# Patient Record
Sex: FEMALE | Race: OTHER
[De-identification: names, ages, dates, MRNs, and addresses within clinical notes are randomized per-mention and may not be internally consistent; named-entity substitution may affect disease eponyms.]

---

## 2017-04-28 ENCOUNTER — HOSPITAL ENCOUNTER (EMERGENCY)
Dept: HOSPITAL 63 - ER | Age: 22
Discharge: HOME | End: 2017-04-28
Payer: OTHER GOVERNMENT

## 2017-04-28 VITALS — DIASTOLIC BLOOD PRESSURE: 75 MMHG | SYSTOLIC BLOOD PRESSURE: 108 MMHG

## 2017-04-28 DIAGNOSIS — R10.9: ICD-10-CM

## 2017-04-28 DIAGNOSIS — R11.2: Primary | ICD-10-CM

## 2017-04-28 DIAGNOSIS — R19.7: ICD-10-CM

## 2017-04-28 LAB
% LYMPHS: 8 % (ref 24–48)
% MONOS: 3 % (ref 0–10)
% SEGS: 88 % (ref 35–66)
ALBUMIN SERPL-MCNC: 4.6 G/DL (ref 3.4–5)
ALBUMIN/GLOB SERPL: 1 {RATIO} (ref 1–1.7)
ALP SERPL-CCNC: 97 U/L (ref 46–116)
ALT SERPL-CCNC: 22 U/L (ref 14–59)
ANION GAP SERPL CALC-SCNC: 11 MMOL/L (ref 6–14)
AST SERPL-CCNC: 12 U/L (ref 15–37)
BASOPHILS # BLD AUTO: 0 X10^3/UL (ref 0–0.2)
BASOPHILS NFR BLD: 0 % (ref 0–3)
BILIRUB SERPL-MCNC: 1 MG/DL (ref 0.2–1)
BUN/CREAT SERPL: 21 (ref 6–20)
CA-I SERPL ISE-MCNC: 19 MG/DL (ref 7–20)
CALCIUM SERPL-MCNC: 9.6 MG/DL (ref 8.5–10.1)
CHLORIDE SERPL-SCNC: 103 MMOL/L (ref 98–107)
CO2 SERPL-SCNC: 25 MMOL/L (ref 21–32)
CREAT SERPL-MCNC: 0.9 MG/DL (ref 0.6–1)
EOSINOPHIL NFR BLD AUTO: 1 % (ref 0–5)
EOSINOPHIL NFR BLD: 0.1 X10^3/UL (ref 0–0.7)
EOSINOPHIL NFR BLD: 1 % (ref 0–3)
ERYTHROCYTE [DISTWIDTH] IN BLOOD BY AUTOMATED COUNT: 12.7 % (ref 11.5–14.5)
GFR SERPLBLD BASED ON 1.73 SQ M-ARVRAT: 79.8 ML/MIN
GLOBULIN SER-MCNC: 4.5 G/DL (ref 2.2–3.8)
GLUCOSE SERPL-MCNC: 92 MG/DL (ref 70–99)
HCT VFR BLD CALC: 46.8 % (ref 36–47)
HGB BLD-MCNC: 15.6 G/DL (ref 12–15.5)
LIPASE: 63 U/L (ref 73–393)
LYMPHOCYTES # BLD: 1.2 X10^3/UL (ref 1–4.8)
LYMPHOCYTES NFR BLD AUTO: 8 % (ref 24–48)
MCH RBC QN AUTO: 29 PG (ref 25–35)
MCHC RBC AUTO-ENTMCNC: 33 G/DL (ref 31–37)
MCV RBC AUTO: 88 FL (ref 79–100)
MONO #: 0.8 X10^3/UL (ref 0–1.1)
MONOCYTES NFR BLD: 5 % (ref 0–9)
NEUT #: 13.3 X10^3UL (ref 1.8–7.7)
NEUTROPHILS NFR BLD AUTO: 87 % (ref 31–73)
PLATELET # BLD AUTO: 259 X10^3/UL (ref 140–400)
PLATELET # BLD EST: ADEQUATE 10*3/UL
POTASSIUM SERPL-SCNC: 3.9 MMOL/L (ref 3.5–5.1)
PROT SERPL-MCNC: 9.1 G/DL (ref 6.4–8.2)
RBC # BLD AUTO: 5.34 X10^6/UL (ref 3.5–5.4)
SODIUM SERPL-SCNC: 139 MMOL/L (ref 136–145)
WBC # BLD AUTO: 15.4 X10^3/UL (ref 4–11)

## 2017-04-28 PROCEDURE — 85007 BL SMEAR W/DIFF WBC COUNT: CPT

## 2017-04-28 PROCEDURE — 80053 COMPREHEN METABOLIC PANEL: CPT

## 2017-04-28 PROCEDURE — 85027 COMPLETE CBC AUTOMATED: CPT

## 2017-04-28 PROCEDURE — 36415 COLL VENOUS BLD VENIPUNCTURE: CPT

## 2017-04-28 PROCEDURE — 83690 ASSAY OF LIPASE: CPT

## 2017-04-28 PROCEDURE — 96375 TX/PRO/DX INJ NEW DRUG ADDON: CPT

## 2017-04-28 PROCEDURE — 99284 EMERGENCY DEPT VISIT MOD MDM: CPT

## 2017-04-28 PROCEDURE — 96374 THER/PROPH/DIAG INJ IV PUSH: CPT

## 2017-04-28 PROCEDURE — 96361 HYDRATE IV INFUSION ADD-ON: CPT

## 2017-04-28 NOTE — PHYS DOC
Past History


Past Medical History:  No Pertinent History


Past Surgical History:  No Surgical History


Alcohol Use:  None


Drug Use:  None





Adult General


Chief Complaint


Chief Complaint:  NAUSEA/VOMITING/DIARRHEA





HPI


HPI


21-year-old female presenting to the emergency department for evaluation of 

nausea vomiting diarrhea and abdominal cramping.  Patient says that she woke up 

with these symptoms and says that she vomited yellowish liquid the first time 

and has been dry heaving since that time. Diarrhea is watery and nonbloody.  

She had a steroid injection in her left shoulder yesterday and is concerned 

that this is somehow related to her symptoms today.  She says that she feels 

slightly dizzy and still nauseated.  Abdominal cramping is diffuse and nonfocal 

and comes and goes.





Review of Systems


Review of Systems





Constitutional: Denies fever or chills []


GI: + abdominal pain, nausea, vomiting, diarrhea []


: Denies dysuria or hematuria []


Musculoskeletal: Denies back pain or joint pain []


Integument: Denies rash or skin lesions []


Neurologic: Denies headache, focal weakness.  + dizziness





Allergies


Allergies





 Allergies








Coded Allergies Type Severity Reaction Last Updated Verified


 


  No Known Drug Allergies    4/3/16 No











Physical Exam


Physical Exam





Constitutional: Well developed, well nourished, no acute distress, non-toxic 

appearance. []


Cardiovascular:Heart rate regular rhythm, no murmur []


Lungs & Thorax:  Bilateral breath sounds clear to auscultation []


Abdomen: Bowel sounds normal, soft, no tenderness, no masses, no pulsatile 

masses. [] 


Skin: Warm, dry, no erythema, no rash. Left shoulder appears clean dry and 

intact with no redness or warmth or swelling.


Extremities: No tenderness, no cyanosis, no clubbing, ROM intact, no edema. [] 


Neurologic: Alert and oriented X 3, normal motor function, normal sensory 

function, no focal deficits noted. []





EKG


EKG


[]





Radiology/Procedures


Radiology/Procedures


[]





Course & Med Decision Making


Course & Med Decision Making


Patient with symptoms consistent with a viral gastroenteritis.  I do not think 

that this has anything to do with her steroid shot isn't looked up cortisone 

and and had no listed side effects of nausea vomiting or diarrhea.  It may be 

blunting her immune response to any viral infection but not necessarily caused 

it. Patient will get screening labs IV fluids Zofran and Toradol and then be 

reassessed.


Labs are very unremarkable she has leukocytosis likely related to steroid.  Her 

repeat abdominal exam is benign and she is not drinking water and eating 

crackers without any difficulty swallowing discharge her with Zofran and 

instructed to drink plenty of fluids including water and Gatorade and come back 

to the ER sooner with any worsening pain fevers vomiting or additional 

concerns.  Patientt aware and agreeable with plan and verbalized understanding 

of the above instructions.





Dragon Disclaimer


Dragon Disclaimer


This chart was dictated in whole or in part using Voice Recognition software in 

a busy, high-work load, and often noisy Emergency Department environment.  It 

may contain unintended and wholly unrecognized errors or omissions.





Departure


Departure:


Impression:  


 Primary Impression:  


 Nausea & vomiting


 Additional Impressions:  


 Diarrhea


 Abdominal pain


Disposition:  01 HOME, SELF-CARE


Condition:  GOOD


Referrals:  


MARIE DOUGLAS (PCP)


Patient Instructions:  Viral Gastroenteritis


Scripts


Ondansetron (Zofran Odt)4 Mg Tab.rapdis1 Tab SL Q8HRS #10 TAB


   Prov:DARRYL KIM DO         4/28/17





Problem Qualifiers








 Primary Impression:  


 Nausea & vomiting


 Vomiting type:  unspecified  Vomiting Intractability:  non-intractable  

Qualified Code:  R11.2 - Nausea with vomiting, unspecified





DARRYL KIM DO Apr 28, 2017 11:37

## 2017-09-30 ENCOUNTER — HOSPITAL ENCOUNTER (EMERGENCY)
Dept: HOSPITAL 63 - ER | Age: 22
Discharge: HOME | End: 2017-09-30
Payer: OTHER GOVERNMENT

## 2017-09-30 VITALS — HEIGHT: 63 IN | WEIGHT: 145.51 LBS | BODY MASS INDEX: 25.78 KG/M2

## 2017-09-30 VITALS
SYSTOLIC BLOOD PRESSURE: 115 MMHG | SYSTOLIC BLOOD PRESSURE: 115 MMHG | DIASTOLIC BLOOD PRESSURE: 63 MMHG | DIASTOLIC BLOOD PRESSURE: 63 MMHG

## 2017-09-30 DIAGNOSIS — R35.0: ICD-10-CM

## 2017-09-30 DIAGNOSIS — R31.9: Primary | ICD-10-CM

## 2017-09-30 DIAGNOSIS — R39.15: ICD-10-CM

## 2017-09-30 LAB
APTT PPP: YELLOW S
BILIRUB UR QL STRIP: (no result)
FIBRINOGEN PPP-MCNC: (no result) MG/DL
GLUCOSE UR STRIP-MCNC: (no result) MG/DL
NITRITE UR QL STRIP: (no result)
SP GR UR STRIP: >=1.03
UROBILINOGEN UR-MCNC: 1 MG/DL

## 2017-09-30 PROCEDURE — 81025 URINE PREGNANCY TEST: CPT

## 2017-09-30 PROCEDURE — 87086 URINE CULTURE/COLONY COUNT: CPT

## 2017-09-30 PROCEDURE — 96372 THER/PROPH/DIAG INJ SC/IM: CPT

## 2017-09-30 PROCEDURE — 74176 CT ABD & PELVIS W/O CONTRAST: CPT

## 2017-09-30 PROCEDURE — 81003 URINALYSIS AUTO W/O SCOPE: CPT

## 2017-09-30 PROCEDURE — 99285 EMERGENCY DEPT VISIT HI MDM: CPT

## 2017-09-30 NOTE — RAD
INDICATION: 375225.001 Severe left sided flank pain 

 

COMPARISON: June 17, 2016

 

TECHNIQUE: 

 

Axial CT images were obtained through the abdomen and pelvis without 

intravenous contrast. 

Limited assessment of solid organ structures and vasculature secondary to 

lack of intravenous contrast. 

 

One or more of the following individualized dose reduction techniques were

utilized for this examination:  1. Automated exposure control;  2. 

Adjustment of the mA and/or kV according to patient size;  3. Use of 

iterative reconstruction technique.

 

FINDINGS:

 

Chest Base: Tiny nodules at left lung base. Given the patient's age these 

are most likely benign unless they have a history of neoplasm.

Vessels: No abdominal aortic aneurysm.

Liver/Biliary: No intrahepatic biliary duct dilation.

Pancreas: No peripancreatic edema.

Spleen: Normal.

Kidneys/Adrenal: No hydronephrosis.

Bladder: No definite adjacent inflammation.

GI: No free air.  No bowel dilation to suggest obstruction. Moderate stool

and proximal colon.

Repeat demonstration of a calcification within the right hemipelvis.

Small free fluid.

 

IMPRESSION:

 

1. No definite hydronephrosis or radiopaque obstructive ureter stone

 

 

2. The suspected appendix does not have definite adjacent inflammation.

 

 

Electronically signed by: Charles Hanson MD (9/30/2017 3:41 AM) [facility]

## 2017-09-30 NOTE — ED.ADGEN
Past History


Past Medical History:  No Pertinent History


Past Surgical History:  No Surgical History


Alcohol Use:  None


Drug Use:  None





Adult General


Chief Complaint


Chief Complaint


Left-sided flank pain





HPI


HPI





Patient is a  22-year-old female presents with intermittent left-sided in 

pain symptoms yesterday. It is described as dull throbbing, moderate to severe 

as with palpation movement. Patient denies nausea, vomiting. She reports 

urinary frequency and urgency. Denies hematuria. No history of kidney stones. 

Fever chills or sweats. No other acute symptoms or complaints. No prior 

abdominal surgeries. Last menstrual period was 3 weeks ago.





Review of Systems


Review of Systems


ROS as per HPI.





Current Medications


Current Medications





Current Medications








 Medications


  (Trade)  Dose


 Ordered  Sig/Zaheer  Start Time


 Stop Time Status Last Admin


Dose Admin


 


 Ketorolac


 Tromethamine


  (Toradol)  60 mg  1X  ONCE  9/30/17 02:30


 9/30/17 02:31 DC 9/30/17 02:22


60 MG


 


 Ondansetron HCl


  (Zofran Odt)  4 mg  1X  ONCE  9/30/17 02:30


 9/30/17 02:31 DC 9/30/17 02:23


4 MG











Allergies


Allergies





Allergies








Coded Allergies Type Severity Reaction Last Updated Verified


 


  No Known Drug Allergies    4/3/16 No











Physical Exam


Physical Exam





Constitutional: Well developed, well nourished, no acute distress, non-toxic 

appearance. []


HENT: Normocephalic, atraumatic, bilateral external ears normal, oropharynx 

moist, no oral exudates, nose normal. []


Eyes: PERRLA, EOMI, conjunctiva normal, no discharge. [] 


Neck: Normal range of motion, no tenderness, supple, no stridor. [] 


Cardiovascular:Heart rate regular rhythm, no murmur []


Lungs & Thorax:  Bilateral breath sounds clear to auscultation []


Abdomen: Bowel sounds normal, soft, no tenderness, no masses, no pulsatile 

masses. [] 


Skin: Warm, dry, no erythema, no rash. [] 


Back: No tenderness, no CVA tenderness. [] 


Extremities: No tenderness, no cyanosis, no clubbing, ROM intact, no edema. [] 


Neurologic: Alert and oriented X 3, normal motor function, normal sensory 

function, no focal deficits noted. []


Psychologic: Affect normal, judgement normal, mood normal. []





Current Patient Data


Vital Signs





 Vital Signs








  Date Time  Temp Pulse Resp B/P (MAP) Pulse Ox O2 Delivery O2 Flow Rate FiO2


 


9/30/17 01:30 97.5 72 20  98 Room Air  








Lab Results





 Laboratory Tests








Test


  9/30/17


01:53 9/30/17


02:30


 


POC Urine HCG, Qualitative


  hcg negative


(Negative) 


 


 


Urine Collection Type  Unknown  


 


Urine Color  Yellow  


 


Urine Clarity  Hazy  


 


Urine pH  6.0  


 


Urine Specific Gravity  >=1.030  


 


Urine Protein


  


  100 mg/dl


(NEG-TRACE)


 


Urine Glucose (UA)


  


  Neg mg/dL


(NEG)


 


Urine Ketones (Stick)


  


  Neg mg/dL


(NEG)


 


Urine Blood  Large (NEG)  


 


Urine Nitrite  Neg (NEG)  


 


Urine Bilirubin  Neg (NEG)  


 


Urine Urobilinogen Dipstick


  


  1 mg/dL (0.2


mg/dL)


 


Urine Leukocyte Esterase  Neg (NEG)  











EKG


EKG


[]





Radiology/Procedures


Radiology/Procedures


[CT abdomen and pelvis without contrast:]





Course & Med Decision Making


Course & Med Decision Making


Pertinent Labs and Imaging studies reviewed. (See chart for details)





[Left flank pain negative pregnancy test, hematuria present. CT abdomen pelvis]





Final Impression


Final Impression


[]


Problems:  





Dragon Disclaimer


Dragon Disclaimer


This electronic medical record was generated, in whole or in part, using a 

voice recognition dictation system.











ERICA DONALDSON DO Sep 30, 2017 03:04

## 2019-06-15 ENCOUNTER — HOSPITAL ENCOUNTER (EMERGENCY)
Facility: HOSPITAL | Age: 24
Discharge: HOME | End: 2019-06-16
Attending: EMERGENCY MEDICINE
Payer: OTHER GOVERNMENT

## 2019-06-15 DIAGNOSIS — L02.91 ABSCESS: Primary | ICD-10-CM

## 2019-06-15 PROCEDURE — 87186 SC STD MICRODIL/AGAR DIL: CPT | Performed by: EMERGENCY MEDICINE

## 2019-06-15 PROCEDURE — 87070 CULTURE OTHR SPECIMN AEROBIC: CPT | Performed by: EMERGENCY MEDICINE

## 2019-06-15 PROCEDURE — 87205 SMEAR GRAM STAIN: CPT | Performed by: EMERGENCY MEDICINE

## 2019-06-15 PROCEDURE — 87077 CULTURE AEROBIC IDENTIFY: CPT | Performed by: EMERGENCY MEDICINE

## 2019-06-15 PROCEDURE — 99283 EMERGENCY DEPT VISIT LOW MDM: CPT

## 2019-06-15 PROCEDURE — 10061 I&D ABSCESS COMP/MULTIPLE: CPT

## 2019-06-15 RX ORDER — CLINDAMYCIN HYDROCHLORIDE 300 MG/1
300 CAPSULE ORAL 3 TIMES DAILY
Qty: 30 CAPSULE | Refills: 0 | Status: SHIPPED | OUTPATIENT
Start: 2019-06-15 | End: 2019-06-25

## 2019-06-15 RX ORDER — DEXTROAMPHETAMINE SACCHARATE, AMPHETAMINE ASPARTATE MONOHYDRATE, DEXTROAMPHETAMINE SULFATE AND AMPHETAMINE SULFATE 5; 5; 5; 5 MG/1; MG/1; MG/1; MG/1
20 CAPSULE, EXTENDED RELEASE ORAL EVERY MORNING
COMMUNITY

## 2019-06-15 RX ORDER — HYDROCODONE BITARTRATE AND ACETAMINOPHEN 5; 325 MG/1; MG/1
1-2 TABLET ORAL EVERY 4 HOURS PRN
Qty: 20 TABLET | Refills: 0 | Status: SHIPPED | OUTPATIENT
Start: 2019-06-15 | End: 2021-11-16 | Stop reason: ALTCHOICE

## 2019-06-16 VITALS
DIASTOLIC BLOOD PRESSURE: 76 MMHG | HEART RATE: 88 BPM | WEIGHT: 150 LBS | TEMPERATURE: 98 F | SYSTOLIC BLOOD PRESSURE: 119 MMHG | RESPIRATION RATE: 16 BRPM | BODY MASS INDEX: 26.58 KG/M2 | HEIGHT: 63 IN | OXYGEN SATURATION: 99 %

## 2019-06-16 NOTE — ED INITIAL ASSESSMENT (HPI)
Pt presents to ED with complaint of abscess to L groin. PT reports abscess has been reoccurring for about 10 years. Reports present for a couple days and excruciating in pain. PT reports wound is leaking.  Denies fevers

## 2019-06-16 NOTE — ED PROVIDER NOTES
Patient Seen in: [facility] Emergency Department    History   Patient presents with:  Eval-G (genital)    Stated Complaint: eval g    HPI    31-year-old female presents to the emergency department with an area in her left groin that has been swollen Cardiovascular: Normal rate, regular rhythm and normal heart sounds. Pulmonary/Chest: Effort normal and breath sounds normal. No respiratory distress. Genitourinary:         Neurological: She is alert and oriented to person, place, and time.    Skin: Sk

## 2020-11-06 ENCOUNTER — HOSPITAL ENCOUNTER (OUTPATIENT)
Dept: HOSPITAL 63 - US | Age: 25
End: 2020-11-06
Payer: OTHER GOVERNMENT

## 2020-11-06 DIAGNOSIS — N83.291: Primary | ICD-10-CM

## 2020-11-06 PROCEDURE — 76830 TRANSVAGINAL US NON-OB: CPT

## 2020-11-06 PROCEDURE — 76856 US EXAM PELVIC COMPLETE: CPT

## 2020-11-06 NOTE — RAD
EXAM: Pelvic sonogram.

 

HISTORY: Pain.

 

TECHNIQUE: Transabdominal and transvaginal sonographic imaging of the 

pelvis was performed.

 

COMPARISON: CT dated 9/30/2017.

 

FINDINGS: The uterus measures 8.0 x 6.2 x 3.0 cm. The endometrial stripe 

measures 1.1 cm in thickness. The ovaries are normal in size and 

demonstrate normal blood flow. There is a 2.3 cm complex right ovarian 

cyst with internal echoes and 7 mm echogenic posterior shadowing component

likely due to calcification. This demonstrates no blood flow. There is a 

small amount of pelvic free fluid.

 

IMPRESSION:

1. 2.3 cm complex right ovarian cyst with internal echoes and suspected 

calcification.

2. Small amount of pelvic free fluid, within physiologic limits for a 

premenopausal female.

 

Electronically signed by: Jazmin Yin MD (11/6/2020 11:33 AM) [id number]

## 2020-11-11 ENCOUNTER — HOSPITAL ENCOUNTER (EMERGENCY)
Dept: HOSPITAL 63 - ER | Age: 25
Discharge: HOME | End: 2020-11-11
Payer: OTHER GOVERNMENT

## 2020-11-11 VITALS — BODY MASS INDEX: 25.78 KG/M2 | HEIGHT: 63 IN | WEIGHT: 145.51 LBS

## 2020-11-11 VITALS — SYSTOLIC BLOOD PRESSURE: 140 MMHG | DIASTOLIC BLOOD PRESSURE: 72 MMHG
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
## 2020-11-11 DIAGNOSIS — T43.621A: Primary | ICD-10-CM

## 2020-11-11 DIAGNOSIS — F90.9: ICD-10-CM

## 2020-11-11 DIAGNOSIS — F32.9: ICD-10-CM

## 2020-11-11 LAB
ACETAMIN: < 2 MCG/ML (ref 10–30)
ALBUMIN SERPL-MCNC: 4.3 G/DL (ref 3.4–5)
ALBUMIN/GLOB SERPL: 1.1 {RATIO} (ref 1–1.7)
ALP SERPL-CCNC: 88 U/L (ref 46–116)
ALT SERPL-CCNC: 18 U/L (ref 14–59)
AMPHETAMINE/METHAMPHETAMINE: (no result)
ANION GAP SERPL CALC-SCNC: 13 MMOL/L (ref 6–14)
AST SERPL-CCNC: 12 U/L (ref 15–37)
BARBITURATES UR-MCNC: (no result) UG/ML
BENZODIAZ UR-MCNC: (no result) UG/L
BILIRUB SERPL-MCNC: 0.3 MG/DL (ref 0.2–1)
BUN/CREAT SERPL: 10 (ref 6–20)
CA-I SERPL ISE-MCNC: 8 MG/DL (ref 7–20)
CALCIUM SERPL-MCNC: 9 MG/DL (ref 8.5–10.1)
CANNABINOIDS UR-MCNC: (no result) UG/L
CHLORIDE SERPL-SCNC: 105 MMOL/L (ref 98–107)
CO2 SERPL-SCNC: 24 MMOL/L (ref 21–32)
COCAINE UR-MCNC: (no result) NG/ML
CREAT SERPL-MCNC: 0.8 MG/DL (ref 0.6–1)
ETHANOL SERPL-MCNC: 170 MG/DL (ref 0–10)
GFR SERPLBLD BASED ON 1.73 SQ M-ARVRAT: 88.1 ML/MIN
GLOBULIN SER-MCNC: 4 G/DL (ref 2.2–3.8)
GLUCOSE SERPL-MCNC: 104 MG/DL (ref 70–99)
METHADONE SERPL-MCNC: (no result) NG/ML
OPIATES UR-MCNC: (no result) NG/ML
PCP SERPL-MCNC: (no result) MG/DL
POTASSIUM SERPL-SCNC: 3.6 MMOL/L (ref 3.5–5.1)
PROT SERPL-MCNC: 8.3 G/DL (ref 6.4–8.2)
SALIC: < 2.8 MG/DL (ref 2.8–20)
SODIUM SERPL-SCNC: 142 MMOL/L (ref 136–145)

## 2020-11-11 PROCEDURE — 71045 X-RAY EXAM CHEST 1 VIEW: CPT

## 2020-11-11 PROCEDURE — 80307 DRUG TEST PRSMV CHEM ANLYZR: CPT

## 2020-11-11 PROCEDURE — 82550 ASSAY OF CK (CPK): CPT

## 2020-11-11 PROCEDURE — 80329 ANALGESICS NON-OPIOID 1 OR 2: CPT

## 2020-11-11 PROCEDURE — 81025 URINE PREGNANCY TEST: CPT

## 2020-11-11 PROCEDURE — 80053 COMPREHEN METABOLIC PANEL: CPT

## 2020-11-11 PROCEDURE — 99285 EMERGENCY DEPT VISIT HI MDM: CPT

## 2020-11-11 PROCEDURE — 36415 COLL VENOUS BLD VENIPUNCTURE: CPT

## 2020-11-11 PROCEDURE — 84484 ASSAY OF TROPONIN QUANT: CPT

## 2020-11-11 PROCEDURE — 83880 ASSAY OF NATRIURETIC PEPTIDE: CPT

## 2020-11-11 PROCEDURE — 93005 ELECTROCARDIOGRAM TRACING: CPT

## 2020-11-11 PROCEDURE — G0480 DRUG TEST DEF 1-7 CLASSES: HCPCS

## 2020-11-11 NOTE — RAD
Study: CR CHEST AP ONLY

 

Indication: Overdose.

 

Comparison: None.

 

Findings:

 

Unremarkable cardiomediastinal silhouette and sanchez. No focal airspace 

infiltrate, pleural effusion or pneumothorax.

 

Impression:

 

Unremarkable radiographic appearance of the chest.

 

Electronically signed by: JAQUELINE THOMAS MD (11/11/2020 5:14 AM) [id number]

## 2020-11-11 NOTE — EKG
[facility] [address], [place] [address]

Test Date:    2020               Test Time:    04:24:57

Pat Name:     SAVANNA GIBBONS             Department:   

Patient ID:   [id number]           Room:          

Gender:       F                        Technician:   

:          [id number]               Requested By: BOBBI CALLE

Order Number: [id number]            Reading MD:     

                                 Measurements

Intervals                              Axis          

Rate:         85                       P:            53

WI:           146                      QRS:          76

QRSD:         82                       T:            21

QT:           362                                    

QTc:          431                                    

                           Interpretive Statements

SINUS RHYTHM

NO SPECIFIC ECG ABNORMALITIES

RI6.02

No previous ECG available for comparison

## 2020-11-11 NOTE — PHYS DOC
Past History


Past Medical History:  Depression, Other


Additional Past Medical Histor:  adhd


Past Surgical History:  No Surgical History


Alcohol Use:  Occasionally


Drug Use:  None





Adult General


Chief Complaint


Chief Complaint:  OVERDOSE





HPI


HPI


Patient is 25-year-old female who presents to the emergency room after taking 4 

of her Adderall and drinking a large amount of alcohol.  She is on extended 

release Adderall.  She has no complaints but is concerned that it could have 

hurt her.  She denies any suicidal ideations.  She states she was trying to get 

high.





Review of Systems


Review of Systems


Complete ROS is negative unless otherwise documented in HPI





Allergies


Allergies





Allergies








Coded Allergies Type Severity Reaction Last Updated Verified


 


  No Known Drug Allergies    4/3/16 No











Physical Exam


Physical Exam


General: Awake, alert, NAD. Well Nourished, well hydrated. Cooperative


HEENT: Atraumatic, EOMI, PERRL, airway patent, moist oral mucosa


Neck: Supple, trachea midline


Respiratory: CTA bilaterally, normal effort, no wheezing/crackles


CV: RRR, no murmur, cap refill <2


GI: Soft, nondistended, nontender, no masses


MSK: No obvious deformities


Skin: Warm, dry, intact


Neuro: A&O x3, speech NL, sensory and motor grossly intact, no focal deficits


Psych: Normal affect, normal mood, not suicidal or homicidal





Current Patient Data


Vital Signs





                                   Vital Signs








  Date Time  Temp Pulse Resp B/P (MAP) Pulse Ox O2 Delivery O2 Flow Rate FiO2


 


11/11/20 03:42 98.4 124 16 131/89 (103) 98 Room Air  








Lab Results





                                Laboratory Tests








Test


 11/11/20


03:59 11/11/20


04:15


 


POC Urine HCG, Qualitative


 hcg negative


(Negative) 





 


Sodium Level


 


 142 mmol/L


(136-145)


 


Potassium Level


 


 3.6 mmol/L


(3.5-5.1)


 


Chloride Level


 


 105 mmol/L


()


 


Carbon Dioxide Level


 


 24 mmol/L


(21-32)


 


Anion Gap  13 (6-14)  


 


Blood Urea Nitrogen


 


 8 mg/dL (7-20)





 


Creatinine


 


 0.8 mg/dL


(0.6-1.0)


 


Estimated GFR


(Cockcroft-Gault) 


 88.1  





 


BUN/Creatinine Ratio  10 (6-20)  


 


Glucose Level


 


 104 mg/dL


(70-99)  H


 


Calcium Level


 


 9.0 mg/dL


(8.5-10.1)


 


Total Bilirubin


 


 0.3 mg/dL


(0.2-1.0)


 


Aspartate Amino Transferase


(AST) 


 12 U/L (15-37)


L


 


Alanine Aminotransferase (ALT)


 


 18 U/L (14-59)





 


Alkaline Phosphatase


 


 88 U/L


()


 


Troponin I Quantitative


 


 < 0.017 ng/mL


(0-0.055)


 


NT-Pro-B-Type Natriuretic


Peptide 


 9 pg/mL


(0-124)


 


Total Protein


 


 8.3 g/dL


(6.4-8.2)  H


 


Albumin


 


 4.3 g/dL


(3.4-5.0)


 


Albumin/Globulin Ratio  1.1 (1.0-1.7)  











EKG


EKG


[]





Radiology/Procedures


Radiology/Procedures


[]





Heart Score


Risk Factors:


Risk Factors:  DM, Current or recent (<one month) smoker, HTN, HLP, family 

history of CAD, obesity.


Risk Scores:


Risk Factors:  DM, Current or recent (<one month) smoker, HTN, HLP, family 

history of CAD, obesity.





Course & Med Decision Making


Course & Med Decision Making


Pertinent Labs and Imaging studies reviewed. (See chart for details)





Adderall with alcohol and an attempt to get high.  She is sober at this time.  D

 patient's test results and vitals while in the ED were fully reviewed and d

iscussed with the patient. Patient is stable and at this time does not need 

admission to the hospital. We have discussed strict return precautions and the 

importance of following up with their Primary Care Physician. Patient stated 

understanding and was given an opportunity to ask any questions. Patient is in 

agreement with plan.





Dragon Disclaimer


Dragon Disclaimer


This electronic medical record was generated, in whole or in part, using a voice

 recognition dictation system.





Departure


Departure:


Impression:  


   Primary Impression:  


   Overdose


Disposition:  01 DC HOME SELF CARE/HOMELESS


Condition:  STABLE


Referrals:  


PCP,UNKNOWN (PCP)


Patient Instructions:  Overdose, Adult











BOBBI CALLE MD              Nov 11, 2020 05:18

## 2021-05-20 ENCOUNTER — HOSPITAL ENCOUNTER (EMERGENCY)
Dept: HOSPITAL 63 - ER | Age: 26
Discharge: HOME | End: 2021-05-20
Payer: OTHER GOVERNMENT

## 2021-05-20 VITALS — WEIGHT: 140.21 LBS | HEIGHT: 63 IN | BODY MASS INDEX: 24.84 KG/M2

## 2021-05-20 VITALS — SYSTOLIC BLOOD PRESSURE: 108 MMHG | DIASTOLIC BLOOD PRESSURE: 65 MMHG

## 2021-05-20 DIAGNOSIS — G43.909: Primary | ICD-10-CM

## 2021-05-20 DIAGNOSIS — F32.9: ICD-10-CM

## 2021-05-20 LAB
% LYMPHS: 8 % (ref 24–48)
% MONOS: 3 % (ref 0–10)
% SEGS: 89 % (ref 35–66)
ALBUMIN SERPL-MCNC: 3.8 G/DL (ref 3.4–5)
ALBUMIN/GLOB SERPL: 0.9 {RATIO} (ref 1–1.7)
ALP SERPL-CCNC: 91 U/L (ref 46–116)
ALT SERPL-CCNC: 20 U/L (ref 14–59)
ANION GAP SERPL CALC-SCNC: 11 MMOL/L (ref 6–14)
APTT PPP: YELLOW S
AST SERPL-CCNC: 12 U/L (ref 15–37)
BACTERIA #/AREA URNS HPF: 0 /HPF
BASOPHILS # BLD AUTO: 0 X10^3/UL (ref 0–0.2)
BASOPHILS NFR BLD: 0 % (ref 0–3)
BILIRUB SERPL-MCNC: 0.3 MG/DL (ref 0.2–1)
BILIRUB UR QL STRIP: (no result)
BUN/CREAT SERPL: 17 (ref 6–20)
CA-I SERPL ISE-MCNC: 17 MG/DL (ref 7–20)
CALCIUM SERPL-MCNC: 9.2 MG/DL (ref 8.5–10.1)
CHLORIDE SERPL-SCNC: 106 MMOL/L (ref 98–107)
CO2 SERPL-SCNC: 24 MMOL/L (ref 21–32)
CREAT SERPL-MCNC: 1 MG/DL (ref 0.6–1)
EOSINOPHIL NFR BLD: 0 % (ref 0–3)
EOSINOPHIL NFR BLD: 0 X10^3/UL (ref 0–0.7)
ERYTHROCYTE [DISTWIDTH] IN BLOOD BY AUTOMATED COUNT: 12.5 % (ref 11.5–14.5)
FIBRINOGEN PPP-MCNC: CLEAR MG/DL
GFR SERPLBLD BASED ON 1.73 SQ M-ARVRAT: 68.1 ML/MIN
GLOBULIN SER-MCNC: 4.2 G/DL (ref 2.2–3.8)
GLUCOSE SERPL-MCNC: 127 MG/DL (ref 70–99)
GLUCOSE UR STRIP-MCNC: (no result) MG/DL
HCT VFR BLD CALC: 41.6 % (ref 36–47)
HGB BLD-MCNC: 14.1 G/DL (ref 12–15.5)
LYMPHOCYTES # BLD: 1.1 X10^3/UL (ref 1–4.8)
LYMPHOCYTES NFR BLD AUTO: 7 % (ref 24–48)
MCH RBC QN AUTO: 30 PG (ref 25–35)
MCHC RBC AUTO-ENTMCNC: 34 G/DL (ref 31–37)
MCV RBC AUTO: 88 FL (ref 79–100)
MONO #: 0.3 X10^3/UL (ref 0–1.1)
MONOCYTES NFR BLD: 2 % (ref 0–9)
NEUT #: 14 X10^3UL (ref 1.8–7.7)
NEUTROPHILS NFR BLD AUTO: 91 % (ref 31–73)
NITRITE UR QL STRIP: (no result)
PLATELET # BLD AUTO: 284 X10^3/UL (ref 140–400)
PLATELET # BLD EST: ADEQUATE 10*3/UL
POTASSIUM SERPL-SCNC: 4.3 MMOL/L (ref 3.5–5.1)
PROT SERPL-MCNC: 8 G/DL (ref 6.4–8.2)
RBC # BLD AUTO: 4.75 X10^6/UL (ref 3.5–5.4)
RBC #/AREA URNS HPF: 0 /HPF (ref 0–2)
SODIUM SERPL-SCNC: 141 MMOL/L (ref 136–145)
SP GR UR STRIP: 1.02
SQUAMOUS #/AREA URNS LPF: (no result) /LPF
UROBILINOGEN UR-MCNC: 0.2 MG/DL
WBC # BLD AUTO: 15.4 X10^3/UL (ref 4–11)
WBC #/AREA URNS HPF: 0 /HPF (ref 0–4)

## 2021-05-20 PROCEDURE — 96374 THER/PROPH/DIAG INJ IV PUSH: CPT

## 2021-05-20 PROCEDURE — 81025 URINE PREGNANCY TEST: CPT

## 2021-05-20 PROCEDURE — 85007 BL SMEAR W/DIFF WBC COUNT: CPT

## 2021-05-20 PROCEDURE — 81001 URINALYSIS AUTO W/SCOPE: CPT

## 2021-05-20 PROCEDURE — 96361 HYDRATE IV INFUSION ADD-ON: CPT

## 2021-05-20 PROCEDURE — 80053 COMPREHEN METABOLIC PANEL: CPT

## 2021-05-20 PROCEDURE — 36415 COLL VENOUS BLD VENIPUNCTURE: CPT

## 2021-05-20 PROCEDURE — 96375 TX/PRO/DX INJ NEW DRUG ADDON: CPT

## 2021-05-20 PROCEDURE — 99284 EMERGENCY DEPT VISIT MOD MDM: CPT

## 2021-05-20 PROCEDURE — 85025 COMPLETE CBC W/AUTO DIFF WBC: CPT

## 2021-05-20 NOTE — PHYS DOC
Past History


Past Medical History:  Depression, Migraines, Other


Additional Past Medical Histor:  adhd


Past Surgical History:  Other


Additional Past Surgical Histo:  Endometriosis


Alcohol Use:  None


Drug Use:  None





General Adult


EDM:


Chief Complaint:  HEADACHE





HPI:


HPI:


25-year-old female presents with headache.  She has had a migraine for the last 

6 days.  She states that it is a global pressure sensation.  She has tried extra

strength Tylenol and it has not helped.  She has had migraines this bad in the 

past.  She denies any falls or trauma.  She has some mild blurred vision on 

occasion and is photophobic.  She denies fever or chills.  She recently was 

diagnosed with strep about 2 weeks ago and treated.  She was diagnosed with 

fluid in her left ear by urgent care earlier this morning and placed on a 

steroid.





Review of Systems:


Review of Systems:


Constitutional:  Denies fever or chills 


Eyes:  Denies change in visual acuity 


HENT:  Denies nasal congestion or sore throat 


Respiratory:  Denies cough or shortness of breath 


Cardiovascular:  Denies chest pain or edema 


GI:  Denies abdominal pain, nausea, vomiting, bloody stools or diarrhea 


: Denies dysuria 


Musculoskeletal:  Denies back pain or joint pain 


Integument:  Denies rash 


Neurologic: Headache.  Denies focal weakness or sensory changes 


Endocrine:  Denies polyuria or polydipsia 


Lymphatic:  Denies swollen glands 


Psychiatric:  Denies depression or anxiety





Allergies:


Allergies:





Allergies








Coded Allergies Type Severity Reaction Last Updated Verified


 


  No Known Drug Allergies    4/3/16 No











Physical Exam:


PE:





Constitutional: Well developed, well nourished, no acute distress, non-toxic 

appearance. []


HENT: Normocephalic, atraumatic, bilateral external ears normal, oropharynx 

moist, no oral exudates, nose normal.  Left tympanic membrane with fluid but no 

signs of infection.  Right tympanic membrane normal []


Eyes: PERRLA, EOMI, conjunctiva normal, no discharge.  Photophobia.  [] 


Neck: Normal range of motion, no tenderness, supple, no stridor. [] 


Cardiovascular: Heart rate regular rhythm, no murmur []


Lungs & Thorax:  Bilateral breath sounds clear to auscultation []


Abdomen: Bowel sounds normal, soft, no tenderness, no masses, no pulsatile 

masses. [] 


Skin: Warm, dry, no erythema, no rash. [] 


Back: No tenderness, no CVA tenderness. [] 


Extremities: No tenderness, no cyanosis, no clubbing, ROM intact, no edema. [] 


Neurologic: Alert and oriented X 3, normal motor function, normal sensory 

function, no focal deficits noted. []


Psychologic: Affect normal, judgement normal, mood normal. []





Current Patient Data:


Vital Signs:





                                   Vital Signs








  Date Time  Temp Pulse Resp B/P (MAP) Pulse Ox O2 Delivery O2 Flow Rate FiO2


 


5/20/21 20:45 98.4 106 16 140/85 (103) 98   











EKG:


EKG:


[]





Radiology/Procedures:


Radiology/Procedures:


[]





Heart Score:


C/O Chest Pain:  N/A


Risk Factors:


Risk Factors:  DM, Current or recent (<one month) smoker, HTN, HLP, family 

history of CAD, obesity.


Risk Scores:


Score 0 - 3:  2.5% MACE over next 6 weeks - Discharge Home


Score 4 - 6:  20.3% MACE over next 6 weeks - Admit for Clinical Observation


Score 7 - 10:  72.7% MACE over next 6 weeks - Early Invasive Strategies





Course & Med Decision Making:


Course & Med Decision Making


Pertinent Labs and Imaging studies reviewed. (See chart for details)


Patient's labs are significant for an elevated white count.  This is likely due 

to the steroids.  For her headache I gave her 1 L normal saline, 30 mg of 

Toradol, 25 mg of Benadryl, 10 mg of Reglan.  Patient states she is feeling much

 better at this time.  She feels like she can go home.  She is stable for 

discharge at this time.


[]





Dragon Disclaimer:


Dragon Disclaimer:


This electronic medical record was generated, in whole or in part, using a voice

 recognition dictation system.





Departure


Departure:


Impression:  


   Primary Impression:  


   Migraine


   Qualified Codes:  G43.911 - Migraine, unspecified, intractable, with status 

   migrainosus


Disposition:  02 SHORT TERM HOSPITAL


Condition:  IMPROVED


Referrals:  


UBALDO MCGREGOR-C (PCP)


Patient Instructions:  Migraine Headache, Easy-to-Read











ERICA MCLAUGHLIN DO                 May 20, 2021 21:09

## 2021-07-26 ENCOUNTER — HOSPITAL ENCOUNTER (EMERGENCY)
Dept: HOSPITAL 63 - ER | Age: 26
Discharge: HOME | End: 2021-07-26
Payer: OTHER GOVERNMENT

## 2021-07-26 VITALS — WEIGHT: 140.21 LBS | HEIGHT: 63 IN | BODY MASS INDEX: 24.84 KG/M2

## 2021-07-26 VITALS — DIASTOLIC BLOOD PRESSURE: 78 MMHG | SYSTOLIC BLOOD PRESSURE: 121 MMHG

## 2021-07-26 DIAGNOSIS — R19.7: ICD-10-CM

## 2021-07-26 DIAGNOSIS — R10.31: ICD-10-CM

## 2021-07-26 DIAGNOSIS — G43.909: ICD-10-CM

## 2021-07-26 DIAGNOSIS — R63.0: ICD-10-CM

## 2021-07-26 DIAGNOSIS — R10.32: Primary | ICD-10-CM

## 2021-07-26 LAB
ALBUMIN SERPL-MCNC: 3.8 G/DL (ref 3.4–5)
ALBUMIN/GLOB SERPL: 1 {RATIO} (ref 1–1.7)
ALP SERPL-CCNC: 74 U/L (ref 46–116)
ALT SERPL-CCNC: 24 U/L (ref 14–59)
ANION GAP SERPL CALC-SCNC: 12 MMOL/L (ref 6–14)
APTT PPP: YELLOW S
AST SERPL-CCNC: 17 U/L (ref 15–37)
BACTERIA #/AREA URNS HPF: (no result) /HPF
BASOPHILS # BLD AUTO: 0 X10^3/UL (ref 0–0.2)
BASOPHILS NFR BLD: 0 % (ref 0–3)
BILIRUB SERPL-MCNC: 0.3 MG/DL (ref 0.2–1)
BILIRUB UR QL STRIP: (no result)
BUN/CREAT SERPL: 12 (ref 6–20)
CA-I SERPL ISE-MCNC: 12 MG/DL (ref 7–20)
CALCIUM SERPL-MCNC: 8.7 MG/DL (ref 8.5–10.1)
CHLORIDE SERPL-SCNC: 103 MMOL/L (ref 98–107)
CO2 SERPL-SCNC: 25 MMOL/L (ref 21–32)
CREAT SERPL-MCNC: 1 MG/DL (ref 0.6–1)
EOSINOPHIL NFR BLD: 0.2 X10^3/UL (ref 0–0.7)
EOSINOPHIL NFR BLD: 1 % (ref 0–3)
ERYTHROCYTE [DISTWIDTH] IN BLOOD BY AUTOMATED COUNT: 12.2 % (ref 11.5–14.5)
FECAL OB PT: POSITIVE
FIBRINOGEN PPP-MCNC: CLEAR MG/DL
GFR SERPLBLD BASED ON 1.73 SQ M-ARVRAT: 67.6 ML/MIN
GLOBULIN SER-MCNC: 3.9 G/DL (ref 2.2–3.8)
GLUCOSE SERPL-MCNC: 84 MG/DL (ref 70–99)
GLUCOSE UR STRIP-MCNC: (no result) MG/DL
HCT VFR BLD CALC: 37.2 % (ref 36–47)
HGB BLD-MCNC: 12.8 G/DL (ref 12–15.5)
LIPASE: 48 U/L (ref 73–393)
LYMPHOCYTES # BLD: 3.9 X10^3/UL (ref 1–4.8)
LYMPHOCYTES NFR BLD AUTO: 33 % (ref 24–48)
MCH RBC QN AUTO: 30 PG (ref 25–35)
MCHC RBC AUTO-ENTMCNC: 35 G/DL (ref 31–37)
MCV RBC AUTO: 88 FL (ref 79–100)
MONO #: 1.2 X10^3/UL (ref 0–1.1)
MONOCYTES NFR BLD: 10 % (ref 0–9)
NEUT #: 6.6 X10^3UL (ref 1.8–7.7)
NEUTROPHILS NFR BLD AUTO: 55 % (ref 31–73)
NITRITE UR QL STRIP: (no result)
PLATELET # BLD AUTO: 257 X10^3/UL (ref 140–400)
POTASSIUM SERPL-SCNC: 3.2 MMOL/L (ref 3.5–5.1)
PROT SERPL-MCNC: 7.7 G/DL (ref 6.4–8.2)
RBC # BLD AUTO: 4.25 X10^6/UL (ref 3.5–5.4)
RBC #/AREA URNS HPF: (no result) /HPF (ref 0–2)
SODIUM SERPL-SCNC: 140 MMOL/L (ref 136–145)
SP GR UR STRIP: >=1.03
SQUAMOUS #/AREA URNS LPF: (no result) /LPF
UROBILINOGEN UR-MCNC: 0.2 MG/DL
WBC # BLD AUTO: 11.9 X10^3/UL (ref 4–11)
WBC #/AREA URNS HPF: (no result) /HPF (ref 0–4)

## 2021-07-26 PROCEDURE — 81025 URINE PREGNANCY TEST: CPT

## 2021-07-26 PROCEDURE — 96375 TX/PRO/DX INJ NEW DRUG ADDON: CPT

## 2021-07-26 PROCEDURE — 80053 COMPREHEN METABOLIC PANEL: CPT

## 2021-07-26 PROCEDURE — 36415 COLL VENOUS BLD VENIPUNCTURE: CPT

## 2021-07-26 PROCEDURE — 85025 COMPLETE CBC W/AUTO DIFF WBC: CPT

## 2021-07-26 PROCEDURE — 74177 CT ABD & PELVIS W/CONTRAST: CPT

## 2021-07-26 PROCEDURE — 81001 URINALYSIS AUTO W/SCOPE: CPT

## 2021-07-26 PROCEDURE — 82274 ASSAY TEST FOR BLOOD FECAL: CPT

## 2021-07-26 PROCEDURE — 96374 THER/PROPH/DIAG INJ IV PUSH: CPT

## 2021-07-26 PROCEDURE — 99285 EMERGENCY DEPT VISIT HI MDM: CPT

## 2021-07-26 PROCEDURE — 96361 HYDRATE IV INFUSION ADD-ON: CPT

## 2021-07-26 PROCEDURE — 83690 ASSAY OF LIPASE: CPT

## 2021-07-26 NOTE — PHYS DOC
Past History


Past Medical History:  Depression, Migraines, Other


Additional Past Medical Histor:  adhd


 (ISABEL MAGDALENO)


Past Surgical History:  Other


Additional Past Surgical Histo:  Endometriosis


 (ISABEL MAGDALENO)


Alcohol Use:  None


Drug Use:  None


 (ISABEL MAGDALENO)





General Adult


EDM:


Chief Complaint:  ABDOMINAL PAIN





HPI:


HPI:


Patient is a 26-year-old female being seen in the ER today for bilateral lower 

abdominal pain, decreased appetite, diarrhea, nausea.  Patient reports that over

the last 3 days she has had blood-tinged stools.  Patient currently rates her 

pain 3 out of 10 it is intermittent and at its worst is 8 out of 10.  No 

treatment prior to arrival.  Patient denies vomiting, fevers, vaginal bleeding, 

urinary frequency/urgency, dysuria.


 (ISABEL MAGDALENO)





Review of Systems:


Review of Systems:


14 body systems of the review of systems have been reviewed.  See HPI for 

pertinent positive and negative responses, otherwise all other systems are 

negative, nonpertinent or noncontributory


 (ISABEL MAGDALENO)





Allergies:


Allergies:





Allergies








Coded Allergies Type Severity Reaction Last Updated Verified


 


  No Known Drug Allergies    4/3/16 No








 (ISABEL MAGDALENO)





Physical Exam:


PE:





Constitutional: Well developed, well nourished, no acute distress, non-toxic 

appearance. []


HENT: Normocephalic, atraumatic


Eyes: PERRL, conjunctiva normal, no discharge. [] 


Neck: Normal range of motion, no stridor


Cardiovascular:Heart rate regular rhythm, no murmur []


Lungs & Thorax:  Bilateral breath sounds clear to auscultation []


Abdomen: Bowel sounds normal, soft, no masses, no pulsatile masses, left lower 

quadrant pain with palpation, negative rebound tenderness, negative Rovsing 

sign. [] 


Skin: Warm, dry, no erythema, no rash. [] 


Back: No tenderness, no CVA tenderness. [] 


Extremities: No tenderness, no cyanosis, no clubbing, ROM intact, no edema. [] 


Neurologic: Alert and oriented X 3, normal motor function, normal sensory 

function, no focal deficits noted. []


Psychologic: Affect normal, judgement normal, mood normal. []


 (ISABEL MAGDALENO)





Current Patient Data:


Labs:





Laboratory Tests








Test


 7/26/21


18:35 7/26/21


18:40 7/26/21


19:00


 


Urine Collection Type Unknown   


 


Urine Color Yellow   


 


Urine Clarity Clear   


 


Urine pH 5.5   


 


Urine Specific Gravity >=1.030   


 


Urine Protein Neg   


 


Urine Glucose (UA) Neg mg/dL   


 


Urine Ketones (Stick) 15 mg/dL   


 


Urine Blood Trace   


 


Urine Nitrite Neg   


 


Urine Bilirubin Small   


 


Urine Urobilinogen Dipstick 0.2 mg/dL   


 


Urine Leukocyte Esterase Neg   


 


Urine RBC Occ /HPF   


 


Urine WBC Occ /HPF   


 


Urine Squamous Epithelial


Cells Mod /LPF 


 


 





 


Urine Bacteria Few /HPF   


 


Urine Mucus Mod /LPF   


 


White Blood Count  11.9 x10^3/uL  


 


Red Blood Count  4.25 x10^6/uL  


 


Hemoglobin  12.8 g/dL  


 


Hematocrit  37.2 %  


 


Mean Corpuscular Volume  88 fL  


 


Mean Corpuscular Hemoglobin  30 pg  


 


Mean Corpuscular Hemoglobin


Concent 


 35 g/dL 


 





 


Red Cell Distribution Width  12.2 %  


 


Platelet Count  257 x10^3/uL  


 


Neutrophils (%) (Auto)  55 %  


 


Lymphocytes (%) (Auto)  33 %  


 


Monocytes (%) (Auto)  10 %  


 


Eosinophils (%) (Auto)  1 %  


 


Basophils (%) (Auto)  0 %  


 


Neutrophils # (Auto)  6.6 x10^3uL  


 


Lymphocytes # (Auto)  3.9 x10^3/uL  


 


Monocytes # (Auto)  1.2 x10^3/uL  


 


Eosinophils # (Auto)  0.2 x10^3/uL  


 


Basophils # (Auto)  0.0 x10^3/uL  


 


Sodium Level  140 mmol/L  


 


Potassium Level  3.2 mmol/L  


 


Chloride Level  103 mmol/L  


 


Carbon Dioxide Level  25 mmol/L  


 


Anion Gap  12  


 


Blood Urea Nitrogen  12 mg/dL  


 


Creatinine  1.0 mg/dL  


 


Estimated GFR


(Cockcroft-Gault) 


 67.6 


 





 


BUN/Creatinine Ratio  12  


 


Glucose Level  84 mg/dL  


 


Calcium Level  8.7 mg/dL  


 


Total Bilirubin  0.3 mg/dL  


 


Aspartate Amino Transf


(AST/SGOT) 


 17 U/L 


 





 


Alanine Aminotransferase


(ALT/SGPT) 


 24 U/L 


 





 


Alkaline Phosphatase  74 U/L  


 


Total Protein  7.7 g/dL  


 


Albumin  3.8 g/dL  


 


Albumin/Globulin Ratio  1.0  


 


Lipase  48 U/L  


 


Bedside Urine HCG, Qualitative   hcg negative 








Current Medications








 Medications


  (Trade)  Dose


 Ordered  Sig/Zaheer


 Route


 PRN Reason  Start Time


 Stop Time Status Last Admin


Dose Admin


 


 Iohexol


  (Omnipaque 300


 Mg/ml)  75 ml  1X  ONCE


 IV


   7/26/21 19:15


 7/26/21 19:16 DC 7/26/21 19:12





 


 Sodium Chloride  1,000 ml @ 


 1,000 mls/hr  1X  ONCE


 IV


   7/26/21 19:15


 7/26/21 20:14  7/26/21 19:32





 


 Ondansetron HCl


  (Zofran)  4 mg  1X  ONCE


 IVP


   7/26/21 19:15


 7/26/21 19:16 DC 7/26/21 19:32





 


 Fentanyl Citrate


  (Fentanyl 2ml


 Vial)  50 mcg  1X  ONCE


 IVP


   7/26/21 19:15


 7/26/21 19:16 DC 7/26/21 19:33











                                Laboratory Tests








Test


 7/26/21


19:00


 


POC Urine HCG, Qualitative


 hcg negative


(Negative)








Vital Signs:





                                   Vital Signs








  Date Time  Temp Pulse Resp B/P (MAP) Pulse Ox O2 Delivery O2 Flow Rate FiO2


 


7/26/21 17:08 98.5 106 16 121/78 99   








 (ISABEL MAGDALENO)





EKG:


EKG:


[]


 (ISABEL MAGDALENO)





Radiology/Procedures:


Radiology/Procedures:


PROCEDURE: CT ABD PELV W/ IV CONTRST ONLY





Exam: CT of abdomen and pelvis with contrast





INDICATION: Lower abdominal pain





TECHNIQUE: Sequential axial images through the abdomen and pelvis obtained 

following the administration of 75 mL of Omni 300 IV contrast. Sagittal and 

coronal reformatted images were reconstructed from the axial data and reviewed.





Exposure: One or more of the following in the visualized dose reduction techni

ques were utilized for this examination:


1.  Automated exposure control


2.  Adjustment of the MA and/or KV according to patient size


3.  Use of iterative of reconstructive technique





Comparisons: 9/30/2017





FINDINGS:


Heart size is normal. No pericardial. Visualized lung bases are clear. No 

pleural effusion.





Liver, spleen, pancreas and adrenals are unremarkable. Gallbladder surgically 

absent.





No perinephric inflammation or hydronephrosis. No renal or ureteral calculi are 

identified.





Bladder is decompressed not well evaluated. Uterus not enlarged. Cystic lesion 

at the right adnexa measuring up to 3.6 cm in long axis.





There is mild wall thickening involving the colon with adjacent stranding. Small

 bowel is unremarkable. Small amount of free fluid noted in the pelvis. No free 

intra-abdominal air.





Abdominal aorta has a normal course and caliber.





No enlarged intra-abdominal lymph nodes are identified.





No suspicious osseous lesions or acute fractures.





IMPRESSION:


1.  Findings of diffuse colitis may be infectious or inflammatory in etiology.


2.  Cystic lesion at the right adnexa measuring 3.6 cm in diameter favored 

represent cyst within the right ovary.








Electronically signed by: Maggi Perez MD (7/26/2021 7:41 PM) [facility]














DICTATED AND SIGNED BY:     MAGGI PEREZ MD


DATE:     07/26/21 1937





CC: ERICA MCLAUGHLIN DO; UBALDO MCGREGOR FNP-C; ISABEL MAGDALENO ~MTH0 0


 (ISABEL MAGDALENO)





Heart Score:


C/O Chest Pain:  No


Risk Factors:


Risk Factors:  DM, Current or recent (<one month) smoker, HTN, HLP, family 

history of CAD, obesity.


Risk Scores:


Score 0 - 3:  2.5% MACE over next 6 weeks - Discharge Home


Score 4 - 6:  20.3% MACE over next 6 weeks - Admit for Clinical Observation


Score 7 - 10:  72.7% MACE over next 6 weeks - Early Invasive Strategies


 (ISABEL MGADALENO)





Course & Med Decision Making:


Course & Med Decision Making


Pertinent Labs and Imaging studies reviewed. (See chart for details)





Patient is a 26-year-old female being seen for abdominal pain, diarrhea, decreas

ed appetite, nausea, blood-tinged stools.  Work-up in the ER consisted of blood 

work, UA, CT scan of abdomen.  UA unremarkable, leukocytosis, hypokalemia noted.

  Potassium replaced in the ER.  CT scan of abdomen showed colitis.  Patient 

treated with antibiotic in the ER and discharged home with prescription.  I 

discussed with patient all findings and diagnostic testing as well as the need 

to follow-up with PCP for further evaluation and treatment or return to the ER 

if any new or worsening symptoms.  Strict return precautions were also discussed

 at length.  Patient voiced understanding and agreement with the plan.  Patient 

is hemodynamically stable at the time of disposition.


 (ISABEL MAGDALENO)





Dragon Disclaimer:


Dragon Disclaimer:


This electronic medical record was generated, in whole or in part, using a voice

 recognition dictation system.


 (ISABEL MAGDALENO)


Attending Co-Sign


The patient was seen and interviewed as well as examined at the bedside. The 

chart was reviewed. The case was discussed. Agree with the plan of care.


 (ERICA MCLAUGHLIN DO)





Departure


Departure:


Impression:  


   Primary Impression:  


   Abdominal pain


   Qualified Codes:  R10.32 - Left lower quadrant pain


   Additional Impression:  


   Diarrhea


   Qualified Codes:  R19.7 - Diarrhea, unspecified


Disposition:  01 HOME / SELF CARE / HOMELESS


Condition:  GOOD


Referrals:  


UBALDO MCGREGOR (PCP)


Patient Instructions:  Colitis





Additional Instructions:  


You were seen in the ER today for abdominal pain, diarrhea, nausea.  You were 

noted to have an elevated white blood cell count which is indicative of an i

nfection.  You did have a low potassium level and this was treated in the ER.  

Please make sure that you are eating potassium rich foods when you are home like

 green leafy vegetables.  We lose a lot of potassium with diarrhea.  Please make

 sure that you are increasing your fluid intake to prevent dehydration.  Your CT

 scan showed colitis which is inflammation of your intestines and colon.  

Sometimes this can be caused by a virus or bacteria.  We are treating you with 

an antibiotic please start and finish the antibiotic completely.  Try to stick 

to a clear liquid diet over the next 24 hours.  You can advance your diet as 

tolerated but following the BRAT diet may be beneficial to you.  This involves 

eating foods like banana, rice, applesauce, toast.  Avoid spicy or fatty foods. 

 If you develop severe abdominal pain, increased blood in your stools, 

uncontrollable nausea or vomiting, fevers, shortness of breath, chest pain 

please return to the ER immediately.





EMERGENCY DEPARTMENT GENERAL DISCHARGE INSTRUCTIONS





Thank you for coming to [facility] (ED) today and trusting us

 with you 


care.  We trust that you had a positivie experience in our Emergency Department.

  If you 


wish to speak to the department management, you may call the director at [phone].





YOUR FOLLOW UP INSTRUCTIONS ARE AS FOLLOWS:





1.  Do you have a private Doctor?  If you do not have a private doctor, please 

ask for a 


resource list of physicians or clinics that may be able to assist you with 

follow up care.





2.  The Emergency Physician has interpreted your x-rays.  The X-Ray specialist 

will also 


review them.  If there is a change in the findings, you will be notified in 48 

hours when at 


all possible.





3.  A lab test or culture has been done, your results will be reviewed and you 

will be 


notified if you need a change in treatment.





ADDITIONAL INSTRUCTIONS AND INFORMATION:





1.  Your care today has been supervised by a physician who is specially trained 

in emergency 


care.  Many problems require more than one evaluation for a complete diagnosis 

and 


treatment.  We recommend that you schedule your follow up appointment as 

recommended to 


ensure complete treatment of you illness or injury.  If you are unable to obtain

 follow up 


care and continue to have a problem, or if your condition worsens, we recommend 

that you 


return to the ED.





2.  We are not able to safely determine your condition over the phone nor are we

 able to 


give sound medical advice over the phone.  For these safety reasons, if you call

 for medical 


advice we will ask you to come to the ED for further evaluation.





3.  If you have any questions regarding these discharge instructions please call

 the ED at 


[phone].





SAFETY INFORMATION:





In the interest of safety, wellness, and injury prevention; we encourage you to 

wear your 


sealbelt, if you smoke; quite smoking, and we encourage family to use a 

protective helmet 


for bicycling and other sporting events that present an increased risk for head 

injury.





IF YOUR SYMPTOMS WORSEN OR NEW SYMPTOMS DEVELOP, OR YOU HAVE CONCERNS ABOUT YOUR

 CONDITION; 


OR IF YOUR CONDITION WORSENS WHILE YOU ARE WAITING FOR YOUR FOLLOW UP 

APPOINTMENT; EITHER 


CONTACT YOUR PRIMARY CARE DOCTOR, THE PHYSICIAN WHOSE NAME AND NUMBER YOU WERE 

GIVEN, OR 


RETURN TO THE ED IMMEDIATELY.


Scripts


Azithromycin (ZITHROMAX) 500 Mg Tablet


1 TAB PO DAILY for colitis for 5 Days, #5 TAB 0 Refills


   Prov: ISABEL MAGDALENO         7/26/21











ISABEL MAGDALENO          Jul 26, 2021 19:08


ERICA MCLAUGHLIN DO                 Jul 29, 2021 06:11

## 2021-07-26 NOTE — RAD
Exam: CT of abdomen and pelvis with contrast



INDICATION: Lower abdominal pain



TECHNIQUE: Sequential axial images through the abdomen and pelvis obtained following the administrati
on of 75 mL of Omni 300 IV contrast. Sagittal and coronal reformatted images were reconstructed from 
the axial data and reviewed.



Exposure: One or more of the following in the visualized dose reduction techniques were utilized for 
this examination:

1.  Automated exposure control

2.  Adjustment of the MA and/or KV according to patient size

3.  Use of iterative of reconstructive technique



Comparisons: 9/30/2017



FINDINGS:

Heart size is normal. No pericardial. Visualized lung bases are clear. No pleural effusion.



Liver, spleen, pancreas and adrenals are unremarkable. Gallbladder surgically absent.



No perinephric inflammation or hydronephrosis. No renal or ureteral calculi are identified.



Bladder is decompressed not well evaluated. Uterus not enlarged. Cystic lesion at the right adnexa me
asuring up to 3.6 cm in long axis.



There is mild wall thickening involving the colon with adjacent stranding. Small bowel is unremarkabl
e. Small amount of free fluid noted in the pelvis. No free intra-abdominal air.



Abdominal aorta has a normal course and caliber.



No enlarged intra-abdominal lymph nodes are identified.



No suspicious osseous lesions or acute fractures.



IMPRESSION:

1.  Findings of diffuse colitis may be infectious or inflammatory in etiology.

2.  Cystic lesion at the right adnexa measuring 3.6 cm in diameter favored represent cyst within the 
right ovary.





Electronically signed by: Maggi Coelho MD (7/26/2021 7:41 PM) [facility]

## 2021-07-27 ENCOUNTER — HOSPITAL ENCOUNTER (EMERGENCY)
Dept: HOSPITAL 63 - ER | Age: 26
Discharge: HOME | End: 2021-07-27
Payer: OTHER GOVERNMENT

## 2021-07-27 VITALS — HEIGHT: 63 IN | WEIGHT: 140.21 LBS | BODY MASS INDEX: 24.84 KG/M2

## 2021-07-27 VITALS — SYSTOLIC BLOOD PRESSURE: 116 MMHG | DIASTOLIC BLOOD PRESSURE: 72 MMHG

## 2021-07-27 DIAGNOSIS — F32.9: ICD-10-CM

## 2021-07-27 DIAGNOSIS — K52.9: Primary | ICD-10-CM

## 2021-07-27 DIAGNOSIS — R10.84: ICD-10-CM

## 2021-07-27 DIAGNOSIS — R19.7: ICD-10-CM

## 2021-07-27 DIAGNOSIS — G43.909: ICD-10-CM

## 2021-07-27 LAB
ALBUMIN SERPL-MCNC: 3.4 G/DL (ref 3.4–5)
ALBUMIN/GLOB SERPL: 1 {RATIO} (ref 1–1.7)
ALP SERPL-CCNC: 65 U/L (ref 46–116)
ALT SERPL-CCNC: 22 U/L (ref 14–59)
ANION GAP SERPL CALC-SCNC: 12 MMOL/L (ref 6–14)
AST SERPL-CCNC: 11 U/L (ref 15–37)
BASOPHILS # BLD AUTO: 0 X10^3/UL (ref 0–0.2)
BASOPHILS NFR BLD: 0 % (ref 0–3)
BILIRUB SERPL-MCNC: 0.4 MG/DL (ref 0.2–1)
BUN/CREAT SERPL: 8 (ref 6–20)
CA-I SERPL ISE-MCNC: 7 MG/DL (ref 7–20)
CALCIUM SERPL-MCNC: 8.3 MG/DL (ref 8.5–10.1)
CHLORIDE SERPL-SCNC: 105 MMOL/L (ref 98–107)
CO2 SERPL-SCNC: 23 MMOL/L (ref 21–32)
CREAT SERPL-MCNC: 0.9 MG/DL (ref 0.6–1)
EOSINOPHIL NFR BLD: 0.2 X10^3/UL (ref 0–0.7)
EOSINOPHIL NFR BLD: 2 % (ref 0–3)
ERYTHROCYTE [DISTWIDTH] IN BLOOD BY AUTOMATED COUNT: 12.4 % (ref 11.5–14.5)
GFR SERPLBLD BASED ON 1.73 SQ M-ARVRAT: 76.3 ML/MIN
GLOBULIN SER-MCNC: 3.5 G/DL (ref 2.2–3.8)
GLUCOSE SERPL-MCNC: 83 MG/DL (ref 70–99)
HCT VFR BLD CALC: 36.7 % (ref 36–47)
HGB BLD-MCNC: 12.6 G/DL (ref 12–15.5)
LIPASE: 31 U/L (ref 73–393)
LYMPHOCYTES # BLD: 1.8 X10^3/UL (ref 1–4.8)
LYMPHOCYTES NFR BLD AUTO: 16 % (ref 24–48)
MAGNESIUM SERPL-MCNC: 1.8 MG/DL (ref 1.8–2.4)
MCH RBC QN AUTO: 30 PG (ref 25–35)
MCHC RBC AUTO-ENTMCNC: 34 G/DL (ref 31–37)
MCV RBC AUTO: 88 FL (ref 79–100)
MONO #: 1.2 X10^3/UL (ref 0–1.1)
MONOCYTES NFR BLD: 11 % (ref 0–9)
NEUT #: 7.8 X10^3UL (ref 1.8–7.7)
NEUTROPHILS NFR BLD AUTO: 71 % (ref 31–73)
PLATELET # BLD AUTO: 207 X10^3/UL (ref 140–400)
POTASSIUM SERPL-SCNC: 4.1 MMOL/L (ref 3.5–5.1)
PROT SERPL-MCNC: 6.9 G/DL (ref 6.4–8.2)
RBC # BLD AUTO: 4.18 X10^6/UL (ref 3.5–5.4)
SODIUM SERPL-SCNC: 140 MMOL/L (ref 136–145)
WBC # BLD AUTO: 10.9 X10^3/UL (ref 4–11)

## 2021-07-27 PROCEDURE — 96374 THER/PROPH/DIAG INJ IV PUSH: CPT

## 2021-07-27 PROCEDURE — 99285 EMERGENCY DEPT VISIT HI MDM: CPT

## 2021-07-27 PROCEDURE — 83690 ASSAY OF LIPASE: CPT

## 2021-07-27 PROCEDURE — 96361 HYDRATE IV INFUSION ADD-ON: CPT

## 2021-07-27 PROCEDURE — 85025 COMPLETE CBC W/AUTO DIFF WBC: CPT

## 2021-07-27 PROCEDURE — 36415 COLL VENOUS BLD VENIPUNCTURE: CPT

## 2021-07-27 PROCEDURE — 96375 TX/PRO/DX INJ NEW DRUG ADDON: CPT

## 2021-07-27 PROCEDURE — 80053 COMPREHEN METABOLIC PANEL: CPT

## 2021-07-27 PROCEDURE — 83735 ASSAY OF MAGNESIUM: CPT

## 2021-07-27 PROCEDURE — 83605 ASSAY OF LACTIC ACID: CPT

## 2021-07-27 NOTE — PHYS DOC
Past History


Past Medical History:  Depression, Endometriosis, Migraines, Other


Additional Past Medical Histor:  [facility]


Past Surgical History:  Other


Additional Past Surgical Histo:  abd lap


Smoking:  Non-smoker


Alcohol Use:  None


Drug Use:  None





General Adult


EDM:


Chief Complaint:  ABDOMINAL PAIN





HPI:


HPI:





26 year old female presents with history of diffuse abdominal pain x 5 days.  

Patient seen yesterday for same and found to have colitis on CT imaging.  

Patient was prescribed azithromycin but has not picked up the prescription.  

Reports some associated nausea and diarrhea.  Reports has had such frequent 

diarrhea last night that now she noted bright red blood.  Reports pain not well 

controlled with ibuprofen and/tylenol.   Reports last took tyenol at 0300 this 

AM.  Denies dysuria.  Per [employer] review, UA unremarkable and Upreg negative.





Review of Systems:


Review of Systems:





Constitutional: Denies fever or chills; reports generalized malaise


Eyes: Denies redness or eye pain 


HENT: Denies nasal congestion or sore throat


Respiratory: Denies cough or shortness of breath 


Cardiovascular: Denies chest pain or palpitations


GI: Reports diffuse abdominal pain, nausea, hematochezia, and diarrhea; denies 

vomiting


: Denies dysuria or hematuria


Musculoskeletal: Denies back pain or joint pain


Integument: Denies rash or skin lesions 


Neurologic: Denies headache, focal weakness or sensory changes





Complete systems were reviewed and found to be within normal limits, except as 

documented in this note.





Current Medications:


Current Meds:





Current Medications








 Medications


  (Trade)  Dose


 Ordered  Sig/Zaheer  Start Time


 Stop Time Status Last Admin


Dose Admin


 


 Ciprofloxacin


  (Cipro)  500 mg  1X  ONCE  7/27/21 09:00


 7/27/21 09:01 UNV  





 


 Dexamethasone


 Sodium Phosphate


  (Decadron)  10 mg  1X  ONCE  7/27/21 08:00


 7/27/21 08:02 DC 7/27/21 08:07


10 MG


 


 Famotidine


  (Pepcid Vial)  20 mg  1X  ONCE  7/27/21 08:00


 7/27/21 08:02 DC 7/27/21 08:07


20 MG


 


 Fentanyl Citrate


  (Fentanyl 2ml


 Vial)  50 mcg  1X  ONCE  7/27/21 09:00


 7/27/21 09:01 UNV  





 


 Ketorolac


 Tromethamine


  (Toradol 15mg


 Vial)  15 mg  1X  ONCE  7/27/21 08:00


 7/27/21 08:02 DC 7/27/21 08:07


15 MG


 


 Metronidazole


  (Flagyl)  500 mg  1X  ONCE  7/27/21 09:00


 7/27/21 09:01 UNV  





 


 Ondansetron HCl


  (Zofran)  4 mg  1X  ONCE  7/27/21 08:00


 7/27/21 08:02 DC 7/27/21 08:07


4 MG


 


 Sodium Chloride  1,000 ml @ 


 1,000 mls/hr  1X  ONCE  7/27/21 08:15


 7/27/21 09:14  7/27/21 08:06


1,000 MLS/HR











Allergies:


Allergies:





Allergies








Coded Allergies Type Severity Reaction Last Updated Verified


 


  No Known Drug Allergies    7/27/21 No











Physical Exam:


PE:





Constitutional: Well developed, well nourished, no acute distress, non-toxic 

appearance


HENT: Normocephalic, atraumatic


Eyes: Conjunctiva normal, no discharge


Neck: Normal range of motion, no tenderness, supple


Lungs & Thorax:  No respiratory distress, equal chest rise and fall


Abdomen: Soft, mild diffuse tenderness, no guarding/reboun tenderness/distention


Skin: Warm, dry, no erythema, no rash


Back: No tenderness, no CVA tenderness


Extremities: No tenderness, ROM intact, no edema


Neurologic: Alert and oriented X 3, no focal deficits noted


Psychologic: Affect normal, judgment normal





Current Patient Data:


Labs:





                                Laboratory Tests








Test


 7/27/21


08:04


 


White Blood Count


 10.9 x10^3/uL


(4.0-11.0)


 


Red Blood Count


 4.18 x10^6/uL


(3.50-5.40)


 


Hemoglobin


 12.6 g/dL


(12.0-15.5)


 


Hematocrit


 36.7 %


(36.0-47.0)


 


Mean Corpuscular Volume


 88 fL ()





 


Mean Corpuscular Hemoglobin 30 pg (25-35)  


 


Mean Corpuscular Hemoglobin


Concent 34 g/dL


(31-37)


 


Red Cell Distribution Width


 12.4 %


(11.5-14.5)


 


Platelet Count


 207 x10^3/uL


(140-400)


 


Neutrophils (%) (Auto) 71 % (31-73)  


 


Lymphocytes (%) (Auto) 16 % (24-48)  L


 


Monocytes (%) (Auto) 11 % (0-9)  H


 


Eosinophils (%) (Auto) 2 % (0-3)  


 


Basophils (%) (Auto) 0 % (0-3)  


 


Neutrophils # (Auto)


 7.8 x10^3uL


(1.8-7.7)  H


 


Lymphocytes # (Auto)


 1.8 x10^3/uL


(1.0-4.8)


 


Monocytes # (Auto)


 1.2 x10^3/uL


(0.0-1.1)  H


 


Eosinophils # (Auto)


 0.2 x10^3/uL


(0.0-0.7)


 


Basophils # (Auto)


 0.0 x10^3/uL


(0.0-0.2)


 


Sodium Level


 140 mmol/L


(136-145)


 


Potassium Level


 4.1 mmol/L


(3.5-5.1)


 


Chloride Level


 105 mmol/L


()


 


Carbon Dioxide Level


 23 mmol/L


(21-32)


 


Anion Gap 12 (6-14)  


 


Blood Urea Nitrogen


 7 mg/dL (7-20)





 


Creatinine


 0.9 mg/dL


(0.6-1.0)


 


Estimated GFR


(Cockcroft-Gault) 76.3  





 


BUN/Creatinine Ratio 8 (6-20)  


 


Glucose Level


 83 mg/dL


(70-99)


 


Lactic Acid Level


 0.7 mmol/L


(0.4-2.0)


 


Calcium Level


 8.3 mg/dL


(8.5-10.1)  L


 


Magnesium Level


 1.8 mg/dL


(1.8-2.4)


 


Total Bilirubin


 0.4 mg/dL


(0.2-1.0)


 


Aspartate Amino Transferase


(AST) 11 U/L (15-37)


L


 


Alanine Aminotransferase (ALT)


 22 U/L (14-59)





 


Alkaline Phosphatase


 65 U/L


()


 


Total Protein


 6.9 g/dL


(6.4-8.2)


 


Albumin


 3.4 g/dL


(3.4-5.0)


 


Albumin/Globulin Ratio 1.0 (1.0-1.7)  


 


Lipase


 31 U/L


()  L








Vital Signs:





                                   Vital Signs








  Date Time  Temp Pulse Resp B/P (MAP) Pulse Ox O2 Delivery O2 Flow Rate FiO2


 


7/27/21 08:42  88 18 115/68 (84) 100 Room Air  


 


7/27/21 07:47 97.7       











EKG:


EKG:


[]





Radiology/Procedures:


Radiology/Procedures:


[]





Heart Score:


C/O Chest Pain:  N/A





Course & Med Decision Making:


Course & Med Decision Making


Pertinent Lab studies reviewed. (See chart for details)





Patient presents with diffuse abdominal discomfort.  History of recently being 

seen yesterday for same.  Patient reports throughout the night she has had 

progressive diarrhea to the point she now has some rectal bleeding.  Abdomen 

nonperitoneal.  Imaging reviewed from yesterday noted mild colonic wall 

thickening consistent for colitis.  Patient was prescribed azithromycin but has 

not yet picked up the prescription.  Patient is afebrile.  Symptomatic treatment

 provided.  IV fluid hydration given.  Labs obtained and posted to chart. 





Decision to change empiric antibiotics.  Empiric antibiotics initiated with 

Cipro and Flagyl.  We will also treat with steroid.  Patient does not have known

 history of ulcerative colitis or Crohn's.





Patient stable for discharge with outpatient follow-up with PCP/GI.  GI referral

 provided.  Discussed findings and plan with patient, who acknowledges 

understanding and agreement.





Dragon Disclaimer:


Dragon Disclaimer:


This electronic medical record was generated, in whole or in part, using a voice

 recognition dictation system.





Departure


Departure:


Impression:  


   Primary Impression:  


   Colitis


   Additional Impressions:  


   Nausea


   Diarrhea


   Qualified Codes:  R19.7 - Diarrhea, unspecified


Disposition:  01 HOME / SELF CARE / HOMELESS


Condition:  STABLE


Referrals:  


UBALDO MCGREGOR (PCP)








MEAGAN MAHAN MD


Patient Instructions:  Clear Liquid Diet, Easy-to-Read, Colitis, Diet for 

Diarrhea, Adult





Additional Instructions:  


Do not fill previously prescribed prescription for azithromycin.


Scripts


Hydrocodone Bit/Acetaminophen (HYDROCODONE-APAP 5-325  **) 1 Each Tablet


0.5-1 TAB PO PRN Q6HRS PRN for PAIN, #10 TAB 0 Refills


   Prov: KELLY MCMULLEN DO         7/27/21 


Metronidazole (FLAGYL) 500 Mg Tablet


1 TAB PO TID for Colitis for 7 Days, #21 TAB


   Prov: KELLY MCMULLEN DO         7/27/21 


Ciprofloxacin Hcl (CIPRO) 500 Mg Tablet


1 TAB PO BID for Colitis, #14 TAB


   Prov: KELLY MCMULLEN DO         7/27/21 


Hyoscyamine Sulfate (LEVSIN-SL) 0.125 Mg Tab.subl


0.125 MG SL Q4-6HRS PRN for PAIN, #14 TAB


   Prov: KELLY MCMULLEN DO         7/27/21 


Prednisone (PREDNISONE) 20 Mg Tablet


2 TAB PO DAILY for Colitis, #8 TAB


   Start this prescription tomorrow, Wed 7/28/21


   Prov: KELLY MCMULLEN DO         7/27/21 


Ondansetron (ONDANSETRON ODT) 4 Mg Tab.rapdis


1 TAB PO PRN Q6-8HRS PRN for NAUSEA, #16 TAB


   Prov: KELLY MCMULLEN DO         7/27/21











KELLY MCMULLEN DO             Jul 27, 2021 09:13

## 2021-11-16 ENCOUNTER — HOSPITAL ENCOUNTER (EMERGENCY)
Facility: HOSPITAL | Age: 26
Discharge: HOME | End: 2021-11-16
Attending: EMERGENCY MEDICINE
Payer: OTHER GOVERNMENT

## 2021-11-16 VITALS
HEART RATE: 120 BPM | WEIGHT: 140 LBS | TEMPERATURE: 97 F | HEIGHT: 63 IN | SYSTOLIC BLOOD PRESSURE: 129 MMHG | OXYGEN SATURATION: 100 % | BODY MASS INDEX: 24.8 KG/M2 | RESPIRATION RATE: 18 BRPM | DIASTOLIC BLOOD PRESSURE: 86 MMHG

## 2021-11-16 DIAGNOSIS — U07.1 COVID: Primary | ICD-10-CM

## 2021-11-16 PROCEDURE — 99453 REM MNTR PHYSIOL PARAM SETUP: CPT

## 2021-11-16 PROCEDURE — 99284 EMERGENCY DEPT VISIT MOD MDM: CPT

## 2021-11-16 RX ORDER — BUPROPION HYDROCHLORIDE 150 MG/1
300 TABLET, EXTENDED RELEASE ORAL DAILY
COMMUNITY

## 2021-11-16 NOTE — ED PROVIDER NOTES
Patient Seen in: [facility] Emergency Department      History   Patient presents with:  Covid-19 Test    Stated Complaint: covid test    Subjective:   HPI    This is a pleasant 81-year-old presenting to the emergency department for Covid test. Belia tenderness normal    ED Course     Labs Reviewed   RAPID SARS-COV-2 BY PCR - Abnormal; Notable for the following components:       Result Value    Rapid SARS-CoV-2 by PCR Detected (*)     All other components within normal limits                   MDM

## 2021-11-16 NOTE — ED INITIAL ASSESSMENT (HPI)
Pt states grandfather tested positive for covid on Friday, mother tested positive yesterday, c/o cold symptoms x 3 days, c/o cough/mando/congestion/fatigue.   Pt states is vaccinated

## 2021-12-17 ENCOUNTER — OFFICE VISIT (OUTPATIENT)
Dept: URGENT CARE | Age: 26
End: 2021-12-17

## 2021-12-17 ENCOUNTER — TELEPHONE (OUTPATIENT)
Dept: SCHEDULING | Age: 26
End: 2021-12-17

## 2021-12-17 VITALS
OXYGEN SATURATION: 100 % | TEMPERATURE: 98.8 F | DIASTOLIC BLOOD PRESSURE: 70 MMHG | SYSTOLIC BLOOD PRESSURE: 114 MMHG | RESPIRATION RATE: 18 BRPM | HEART RATE: 110 BPM

## 2021-12-17 DIAGNOSIS — J02.0 STREP THROAT: Primary | ICD-10-CM

## 2021-12-17 LAB
INTERNAL PROCEDURAL CONTROLS ACCEPTABLE: YES
S PYO AG THROAT QL IA.RAPID: POSITIVE

## 2021-12-17 PROCEDURE — 87880 STREP A ASSAY W/OPTIC: CPT | Performed by: NURSE PRACTITIONER

## 2021-12-17 PROCEDURE — 99203 OFFICE O/P NEW LOW 30 MIN: CPT | Performed by: NURSE PRACTITIONER

## 2021-12-17 RX ORDER — AMOXICILLIN 500 MG/1
500 CAPSULE ORAL 2 TIMES DAILY
Qty: 20 CAPSULE | Refills: 0 | Status: SHIPPED | OUTPATIENT
Start: 2021-12-17 | End: 2021-12-27

## 2021-12-17 RX ORDER — BUPROPION HYDROCHLORIDE 300 MG/1
300 TABLET ORAL DAILY
COMMUNITY
Start: 2021-12-03

## 2021-12-17 ASSESSMENT — ENCOUNTER SYMPTOMS
CONSTITUTIONAL NEGATIVE: 1
SHORTNESS OF BREATH: 0
COUGH: 1
GASTROINTESTINAL NEGATIVE: 1
WHEEZING: 0

## 2022-11-29 ENCOUNTER — HOSPITAL ENCOUNTER (OUTPATIENT)
Age: 27
Discharge: HOME | End: 2022-11-29
Payer: COMMERCIAL

## 2022-11-29 VITALS
WEIGHT: 170 LBS | BODY MASS INDEX: 30.12 KG/M2 | SYSTOLIC BLOOD PRESSURE: 126 MMHG | RESPIRATION RATE: 20 BRPM | DIASTOLIC BLOOD PRESSURE: 71 MMHG | OXYGEN SATURATION: 98 % | TEMPERATURE: 98 F | HEIGHT: 63 IN | HEART RATE: 84 BPM

## 2022-11-29 DIAGNOSIS — J06.9 VIRAL URI WITH COUGH: ICD-10-CM

## 2022-11-29 DIAGNOSIS — R05.1 ACUTE COUGH: Primary | ICD-10-CM

## 2022-11-29 LAB
POCT INFLUENZA A: NEGATIVE
POCT INFLUENZA B: NEGATIVE
SARS-COV-2 RNA RESP QL NAA+PROBE: NOT DETECTED

## 2022-11-29 PROCEDURE — U0002 COVID-19 LAB TEST NON-CDC: HCPCS | Performed by: PHYSICIAN ASSISTANT

## 2022-11-29 PROCEDURE — 87502 INFLUENZA DNA AMP PROBE: CPT | Performed by: PHYSICIAN ASSISTANT

## 2022-11-29 PROCEDURE — 99203 OFFICE O/P NEW LOW 30 MIN: CPT | Performed by: PHYSICIAN ASSISTANT

## 2023-07-17 ENCOUNTER — HOSPITAL ENCOUNTER (OUTPATIENT)
Age: 28
Discharge: HOME | End: 2023-07-17
Payer: COMMERCIAL

## 2023-07-17 VITALS
HEART RATE: 76 BPM | BODY MASS INDEX: 30.12 KG/M2 | DIASTOLIC BLOOD PRESSURE: 72 MMHG | SYSTOLIC BLOOD PRESSURE: 137 MMHG | OXYGEN SATURATION: 99 % | HEIGHT: 63 IN | WEIGHT: 170 LBS | TEMPERATURE: 97 F | RESPIRATION RATE: 18 BRPM

## 2023-07-17 DIAGNOSIS — T23.162A SUPERFICIAL BURN OF BACK OF LEFT HAND, INITIAL ENCOUNTER: Primary | ICD-10-CM

## 2023-07-17 PROCEDURE — 99213 OFFICE O/P EST LOW 20 MIN: CPT | Performed by: PHYSICIAN ASSISTANT

## 2023-07-17 RX ORDER — TRAMADOL HYDROCHLORIDE 50 MG/1
TABLET ORAL EVERY 6 HOURS PRN
Qty: 10 TABLET | Refills: 0 | Status: SHIPPED | OUTPATIENT
Start: 2023-07-17 | End: 2023-07-22

## 2023-07-17 NOTE — DISCHARGE INSTRUCTIONS
800 mg of ibuprofen every 8 hours. Tramadol as needed for more severe pain. 20-minute ice baths. Bacitracin dressing.   Follow-up with the wound care clinic

## 2023-07-17 NOTE — ED INITIAL ASSESSMENT (HPI)
Pt sts grabbed a hot pot handle with left hand yesterday while cooking. Unsure of last tetanus. Pt is right handed.

## 2024-11-11 ENCOUNTER — HOSPITAL ENCOUNTER (OUTPATIENT)
Age: 29
Discharge: HOME | End: 2024-11-11
Payer: OTHER GOVERNMENT

## 2024-11-11 ENCOUNTER — APPOINTMENT (OUTPATIENT)
Dept: GENERAL RADIOLOGY | Age: 29
End: 2024-11-11
Attending: PHYSICIAN ASSISTANT
Payer: OTHER GOVERNMENT

## 2024-11-11 VITALS
WEIGHT: 175 LBS | SYSTOLIC BLOOD PRESSURE: 132 MMHG | RESPIRATION RATE: 18 BRPM | HEART RATE: 93 BPM | DIASTOLIC BLOOD PRESSURE: 92 MMHG | OXYGEN SATURATION: 98 % | HEIGHT: 63 IN | BODY MASS INDEX: 31.01 KG/M2 | TEMPERATURE: 97 F
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
## 2024-11-11 PROCEDURE — 90471 IMMUNIZATION ADMIN: CPT | Performed by: PHYSICIAN ASSISTANT

## 2024-11-11 PROCEDURE — 90715 TDAP VACCINE 7 YRS/> IM: CPT | Performed by: PHYSICIAN ASSISTANT

## 2024-11-11 PROCEDURE — 99214 OFFICE O/P EST MOD 30 MIN: CPT | Performed by: PHYSICIAN ASSISTANT

## 2024-11-11 PROCEDURE — 73130 X-RAY EXAM OF HAND: CPT | Performed by: PHYSICIAN ASSISTANT

## 2024-11-11 NOTE — ED INITIAL ASSESSMENT (HPI)
Pt took in a cat a couple of months ago and today it attacked pt's dog. Unknown vaccination history of cat. Pt attempted to separate cat and dog and cat bit and scratched pt on bilat arms/hands and left foot. Co pain to the right wrist and swelling. Incident at 2300 last night.

## 2024-11-11 NOTE — ED PROVIDER NOTES
Patient Seen in: [facility]      History     Chief Complaint   Patient presents with    Bite     cat     Stated Complaint: cat scratches    Subjective:   HPI    30 YO female presents to immediate care for evaluation of of multiple cat scratches to left hand and few at left dorsal foot. Cat bite to right hand. Patient is owner of cat. She states she was trying to break up a fight between her cat and dog. Incident occurred lat night at 2300.         Objective:     Past Medical History:    Attention deficit disorder without mention of hyperactivity    Menorrhagia              History reviewed. No pertinent surgical history.             Social History     Socioeconomic History    Marital status: Single   Tobacco Use    Smoking status: Former    Smokeless tobacco: Never     Social Drivers of Health      [facility]    Housing Stability              Review of Systems    Positive for stated complaint: cat scratches  Other systems are as noted in HPI.  Constitutional and vital signs reviewed.      All other systems reviewed and negative except as noted above.    Physical Exam     ED Triage Vitals [11/11/24 1058]   BP (!) 132/92   Pulse 93   Resp 18   Temp 97.3 °F (36.3 °C)   Temp src Temporal   SpO2 98 %   O2 Device None (Room air)       Current Vitals:   Vital Signs  BP: (!) 132/92  Pulse: 93  Resp: 18  Temp: 97.3 °F (36.3 °C)  Temp src: Temporal    Oxygen Therapy  SpO2: 98 %  O2 Device: None (Room air)        Physical Exam  Vitals and nursing note reviewed.   Constitutional:       General: She is not in acute distress.     Appearance: Normal appearance. She is not ill-appearing, toxic-appearing or diaphoretic.   Cardiovascular:      Rate and Rhythm: Normal rate.   Pulmonary:      Effort: Pulmonary effort is normal. No respiratory distress.   Musculoskeletal:      Right wrist: Normal range of motion.      Right hand: Normal range of motion.    Skin:     Findings: Wound present.      Comments: Multiple superficial scratches to left dorsal hand, and left dorsal foot. Right hand with few superficial puncture wounds, one at right ventral wrist. Erythema at right ventral wrist extends slightly beyond focal puncture site. ROM intact. Full depth of wounds visualized, no appreciable foreign bodies.   Neurological:      Mental Status: She is alert and oriented to person, place, and time.   Psychiatric:         Behavior: Behavior normal.         ED Course   Labs Reviewed - No data to display  XR HAND (MIN 3 VIEWS), RIGHT (CPT=73130)    Result Date: 11/11/2024  PROCEDURE:  XR HAND (MIN 3 VIEWS), RIGHT (CPT=73130)  TECHNIQUE:  Three views of the right hand were obtained.  COMPARISON:  None.  INDICATIONS:  r/o retained FB after cat bite  PATIENT STATED HISTORY: (As transcribed by Technologist)  Patient was bit by a cat to right palm/anterior wrist.    FINDINGS:  Normal joint spaces.  No fracture, expansile lesion or erosion.  No radiopaque foreign body in the soft tissues.              CONCLUSION:  Negative for osseous fracture or radiopaque soft tissue foreign body.  Continued clinical correlation recommended.   LOCATION:  [facility]   Dictated by (CST): Jose Alejandro Nunez MD on 11/11/2024 at 11:41 AM     Finalized by (CST): Jose Alejandro Nunez MD on 11/11/2024 at 11:42 AM           I independently reviewed x-rays.  No acute findings.    MDM      Beyond the superficial cat scratches and puncture wounds, differential also includes retained tooth fragment, tendon injury, less likely fracture.    Physical exam as above. No tendon injury. Right hand x-ray is negative for acute osseous findings, no retained foreign bodies. Asepsis and wound care performed.   Tetanus booster given. Augmentin prescribed.       Medical Decision Making  Amount and/or Complexity of Data Reviewed  Radiology: ordered and independent interpretation performed. Decision-making details documented in ED  Course.    Risk  Prescription drug management.        Disposition and Plan     Clinical Impression:  1. Cat bite, initial encounter    2. Cat scratch of multiple sites         Disposition:  Discharge  11/11/2024 11:52 am    Follow-up:  No follow-up provider specified.        Medications Prescribed:  Discharge Medication List as of 11/11/2024 11:53 AM        START taking these medications    Details   amoxicillin clavulanate 875-125 MG Oral Tab Take 1 tablet by mouth 2 (two) times daily for 7 days., Normal, Disp-14 tablet, R-0                 Supplementary Documentation:

## 2025-06-18 ENCOUNTER — HOSPITAL ENCOUNTER (OUTPATIENT)
Dept: MAMMOGRAPHY | Facility: HOSPITAL | Age: 30
Discharge: HOME | End: 2025-06-18
Attending: NURSE PRACTITIONER
Payer: OTHER GOVERNMENT

## 2025-06-18 DIAGNOSIS — Z12.31 ENCOUNTER FOR SCREENING MAMMOGRAM FOR MALIGNANT NEOPLASM OF BREAST: ICD-10-CM

## 2025-06-18 DIAGNOSIS — Z80.3 FAMILY HISTORY OF BREAST CANCER: ICD-10-CM

## 2025-06-18 PROCEDURE — 77063 BREAST TOMOSYNTHESIS BI: CPT | Performed by: NURSE PRACTITIONER

## 2025-06-18 PROCEDURE — 77067 SCR MAMMO BI INCL CAD: CPT | Performed by: NURSE PRACTITIONER

## 2025-07-18 NOTE — CONSULTS
Breast Surgery New Patient Consultation - [facility]    Bree Matute is a 30 year old patient, referred by MARLENE Phillip, who presents for evaluation of breast cancer risk.    History of Present Illness:   Ms. Bree Matute is a 30 year old woman who presents for evaluation of breast cancer risk. She denies any palpable masses, nipple discharge, skin changes, or axillary adenopathy. She does have some mild breast pain after IUD placement. It is bilateral. She does not have significant past history for breast diseases or breast biopsy. She does have family history of breast cancer.  Her mother had breast cancer at age 51. Her mother had genetic testing and was negative. Her maternal grandmother had breast cancer.  Her paternal grandmother had ovarian cancer at age 76.  The patient does not have a history of genetic testing.    The patient underwent screening mammogram on 2025.  She has density C breast tissue.  Mammogram was negative.          Past Medical History[1]    Past Surgical History[2]    Gynecological History:  Menarche at age 15 and LMP n/a, IUD  Pt is a   Pt was n/a years old at time of first pregnancy.    She denies any breastfeeding .  Age of Menopause: n/a  Type: n/a  She denies any history of hormone replacement therapy, OCP use, or infertility treatment    Medications:    Current Medications[3]    Allergies:    Allergies[4]    Family History:   Family History[5]    She is not of Ashkenazi Buddhist ancestry.    Social History:  History   Alcohol use Not on file       History   Smoking Status    Former   Smokeless Tobacco    Never       Review of Systems:    Review of Systems   Constitutional:  Negative for chills and fever.   HENT:  Negative for sore throat and trouble swallowing.    Eyes:  Negative for visual disturbance.   Respiratory:  Negative for cough, chest tightness and shortness of breath.    Cardiovascular:  Negative for chest pain, palpitations and leg swelling.    Gastrointestinal:  Negative for nausea, vomiting, abdominal pain, diarrhea, constipation and blood in stool.   Genitourinary:  Negative for dysuria, hematuria and difficulty urinating.   Skin:  Negative for rash.   Neurological:  Negative for numbness and headaches.      Otherwise as per HPI.    Physical Exam:    Pulse 83   Resp 16   Wt 77.6 kg (171 lb)   SpO2 96%   BMI 30.29 kg/m²     Physical Exam  Vitals reviewed.   Constitutional:       Appearance: Normal appearance.   HENT:      Head: Normocephalic and atraumatic.   Eyes:      Extraocular Movements: Extraocular movements intact.      Pupils: Pupils are equal, round, and reactive to light.   Cardiovascular:      Rate and Rhythm: Normal rate.   Pulmonary:      Effort: Pulmonary effort is normal.   Chest:   Breasts:     Right: Normal. No mass, nipple discharge, skin change or tenderness.      Left: Normal. No mass, nipple discharge, skin change or tenderness.   Abdominal:      General: Abdomen is flat.      Palpations: Abdomen is soft.   Musculoskeletal:         General: Normal range of motion.      Cervical back: Normal range of motion and neck supple.   Lymphadenopathy:      Upper Body:      Right upper body: No supraclavicular or axillary adenopathy.      Left upper body: No supraclavicular or axillary adenopathy.   Skin:     General: Skin is warm and dry.   Neurological:      General: No focal deficit present.      Mental Status: She is alert and oriented to person, place, and time.   Psychiatric:         Mood and Affect: Mood normal.            Labs/imaging: reviewed in EPIC    Impression:   Ms. Bree Matute is a 30 year old woman presents for high risk of breast cancer    Discussion and Plan:  I had a discussion with the Patient regarding her breast exam. On exam today I found no evidence of disease. I personally reviewed her recent imaging and we discussed this.    We calculated her Tyrer Cuzick score, which revealed an estimated lifetime breast  cancer risk of 25% and a 10-year breast cancer risk of 1.3%. (Results will be scanned into the chart.) This lifetime risk classifies the patient as \"high risk\" for breast cancer.     Genetic counseling:  patient is interested in genetic testing as well, number provided    Further screening:   Mammogram: next mammogram at 41    MRI: Due to dense breast tissue, family history, and high risk of breast cancer, patient would benefit from annual screening MRI starting now. This has been ordered for 12/2025.      Chemoprophylaxis: n/a    Return to clinic:   1 year for breast exam  See PCP/OB/GYN at least annually for additional breast exam    She was given ample opportunity for questions and those questions were answered to her satisfaction. She has been encouraged to contact the office with any questions or concerns. This encounter lasted a total of 55 minutes, more than 50% of which was dedicated to the discussion of management options.     Annemarie Pierson MD  Breast Surgical Oncology              [1]   Past Medical History:   Attention deficit disorder without mention of hyperactivity    Menorrhagia   [2] History reviewed. No pertinent surgical history.  [3]    Amphetamine-Dextroamphet ER 20 MG Oral Capsule SR 24 Hr Take 1 capsule (20 mg total) by mouth every morning.      ALBUTEROL SULFATE (PROVENTIL HFA) 108 (90 BASE) MCG/ACT Inhalation Aero Soln INHALE TWO PUFFS BY MOUTH EVERY FOUR TO SIX HOURS AS NEEDED AND TWO PUFFS BY MOUTH TWENTY MINUTES PRIOR TO EXERTIONS (Patient taking differently: 1 puff as needed for Shortness of Breath.) 6.7 g 2   [4] No Known Allergies  [5]   Family History  Problem Relation Age of Onset    Breast Cancer Mother 51    Other Father         asthma diagnosed at pts age    Breast Cancer Maternal Grandmother 55        UNKNOWN AGE    Ovarian Cancer Paternal Grandmother 76    Other Paternal Grandmother         asthma    Other Other         asthma/asthma

## 2025-07-22 ENCOUNTER — OFFICE VISIT (OUTPATIENT)
Dept: SURGERY | Facility: CLINIC | Age: 30
End: 2025-07-22
Payer: OTHER GOVERNMENT

## 2025-07-22 VITALS — OXYGEN SATURATION: 96 % | RESPIRATION RATE: 16 BRPM | WEIGHT: 171 LBS | HEART RATE: 83 BPM | BODY MASS INDEX: 30 KG/M2

## 2025-07-22 DIAGNOSIS — R92.333 HETEROGENEOUSLY DENSE TISSUE OF BOTH BREASTS ON MAMMOGRAPHY: ICD-10-CM

## 2025-07-22 DIAGNOSIS — Z91.89 AT HIGH RISK FOR BREAST CANCER: Primary | ICD-10-CM

## 2025-07-22 DIAGNOSIS — Z80.3 FAMILY HISTORY OF BREAST CANCER: ICD-10-CM

## 2025-07-22 PROCEDURE — 99205 OFFICE O/P NEW HI 60 MIN: CPT | Performed by: SURGERY

## 2025-07-22 NOTE — PATIENT INSTRUCTIONS
MRI of the breasts has been ordered for around December 2025  If interested in genetic testing, please call [phone] to make an appointment with a genetic counselor at your convenience  Return in 1 year for an exam

## (undated) NOTE — ED AVS SNAPSHOT
Federico Duncan   MRN: [id number]    Department:  [facility] Emergency Department   Date of Visit:  6/15/2019           Disclosure     Insurance plans vary and the physician(s) referred by the ER may not be covered by your plan.  Please contact your tell this physician (or your personal doctor if your instructions are to return to your personal doctor) about any new or lasting problems. The primary care or specialist physician will see patients referred from the [facility] Emergency Department.  Christiano Akins

## (undated) NOTE — LETTER
Date & Time: 7/17/2023, 1:22 PM  Patient: Mel Liu  Encounter Provider(s):    Rosalinda Adam PA-C       To Whom It May Concern:    Mel Liu was seen and treated in our department on 7/17/2023. She can return to work with these limitations: no left handed work for 1 week .     If you have any questions or concerns, please do not hesitate to call.        _____________________________  Physician/APC Signature